# Patient Record
Sex: MALE | Race: BLACK OR AFRICAN AMERICAN | NOT HISPANIC OR LATINO | Employment: FULL TIME | URBAN - METROPOLITAN AREA
[De-identification: names, ages, dates, MRNs, and addresses within clinical notes are randomized per-mention and may not be internally consistent; named-entity substitution may affect disease eponyms.]

---

## 2017-03-24 ENCOUNTER — OFFICE VISIT (OUTPATIENT)
Dept: FAMILY MEDICINE | Facility: CLINIC | Age: 57
End: 2017-03-24
Payer: COMMERCIAL

## 2017-03-24 ENCOUNTER — LAB VISIT (OUTPATIENT)
Dept: LAB | Facility: HOSPITAL | Age: 57
End: 2017-03-24
Attending: INTERNAL MEDICINE
Payer: COMMERCIAL

## 2017-03-24 VITALS
HEART RATE: 74 BPM | HEIGHT: 67 IN | BODY MASS INDEX: 32.62 KG/M2 | DIASTOLIC BLOOD PRESSURE: 70 MMHG | WEIGHT: 207.81 LBS | TEMPERATURE: 99 F | SYSTOLIC BLOOD PRESSURE: 122 MMHG | OXYGEN SATURATION: 98 %

## 2017-03-24 DIAGNOSIS — I10 HYPERTENSION, WELL CONTROLLED: ICD-10-CM

## 2017-03-24 DIAGNOSIS — R73.03 PREDIABETES: ICD-10-CM

## 2017-03-24 DIAGNOSIS — Z00.00 ROUTINE MEDICAL EXAM: ICD-10-CM

## 2017-03-24 DIAGNOSIS — Z00.00 ROUTINE MEDICAL EXAM: Primary | ICD-10-CM

## 2017-03-24 DIAGNOSIS — E66.9 OBESITY, CLASS I, BMI 30-34.9: ICD-10-CM

## 2017-03-24 DIAGNOSIS — Z72.0 TOBACCO USE: ICD-10-CM

## 2017-03-24 LAB
ALBUMIN SERPL BCP-MCNC: 3.9 G/DL
ALP SERPL-CCNC: 45 U/L
ALT SERPL W/O P-5'-P-CCNC: 18 U/L
ANION GAP SERPL CALC-SCNC: 8 MMOL/L
AST SERPL-CCNC: 15 U/L
BASOPHILS # BLD AUTO: 0.01 K/UL
BASOPHILS NFR BLD: 0.2 %
BILIRUB SERPL-MCNC: 1.3 MG/DL
BUN SERPL-MCNC: 15 MG/DL
CALCIUM SERPL-MCNC: 9.6 MG/DL
CHLORIDE SERPL-SCNC: 104 MMOL/L
CHOLEST/HDLC SERPL: 3.7 {RATIO}
CO2 SERPL-SCNC: 28 MMOL/L
CREAT SERPL-MCNC: 1 MG/DL
DIFFERENTIAL METHOD: ABNORMAL
EOSINOPHIL # BLD AUTO: 0.2 K/UL
EOSINOPHIL NFR BLD: 3.9 %
ERYTHROCYTE [DISTWIDTH] IN BLOOD BY AUTOMATED COUNT: 14.5 %
EST. GFR  (AFRICAN AMERICAN): >60 ML/MIN/1.73 M^2
EST. GFR  (NON AFRICAN AMERICAN): >60 ML/MIN/1.73 M^2
GLUCOSE SERPL-MCNC: 106 MG/DL
HCT VFR BLD AUTO: 40.3 %
HDL/CHOLESTEROL RATIO: 26.9 %
HDLC SERPL-MCNC: 156 MG/DL
HDLC SERPL-MCNC: 42 MG/DL
HGB BLD-MCNC: 13 G/DL
LDLC SERPL CALC-MCNC: 99.2 MG/DL
LYMPHOCYTES # BLD AUTO: 1.5 K/UL
LYMPHOCYTES NFR BLD: 33.6 %
MCH RBC QN AUTO: 28.7 PG
MCHC RBC AUTO-ENTMCNC: 32.3 %
MCV RBC AUTO: 89 FL
MONOCYTES # BLD AUTO: 0.5 K/UL
MONOCYTES NFR BLD: 10.8 %
NEUTROPHILS # BLD AUTO: 2.2 K/UL
NEUTROPHILS NFR BLD: 51.3 %
NONHDLC SERPL-MCNC: 114 MG/DL
PLATELET # BLD AUTO: 234 K/UL
PMV BLD AUTO: 11.7 FL
POTASSIUM SERPL-SCNC: 4.7 MMOL/L
PROT SERPL-MCNC: 7.3 G/DL
RBC # BLD AUTO: 4.53 M/UL
SODIUM SERPL-SCNC: 140 MMOL/L
TRIGL SERPL-MCNC: 74 MG/DL
WBC # BLD AUTO: 4.37 K/UL

## 2017-03-24 PROCEDURE — 80053 COMPREHEN METABOLIC PANEL: CPT

## 2017-03-24 PROCEDURE — 3078F DIAST BP <80 MM HG: CPT | Mod: S$GLB,,, | Performed by: INTERNAL MEDICINE

## 2017-03-24 PROCEDURE — 85025 COMPLETE CBC W/AUTO DIFF WBC: CPT

## 2017-03-24 PROCEDURE — 99396 PREV VISIT EST AGE 40-64: CPT | Mod: S$GLB,,, | Performed by: INTERNAL MEDICINE

## 2017-03-24 PROCEDURE — 36415 COLL VENOUS BLD VENIPUNCTURE: CPT | Mod: PO

## 2017-03-24 PROCEDURE — 99999 PR PBB SHADOW E&M-EST. PATIENT-LVL III: CPT | Mod: PBBFAC,,, | Performed by: INTERNAL MEDICINE

## 2017-03-24 PROCEDURE — 83036 HEMOGLOBIN GLYCOSYLATED A1C: CPT

## 2017-03-24 PROCEDURE — 3074F SYST BP LT 130 MM HG: CPT | Mod: S$GLB,,, | Performed by: INTERNAL MEDICINE

## 2017-03-24 PROCEDURE — 80061 LIPID PANEL: CPT

## 2017-03-24 RX ORDER — LISINOPRIL 20 MG/1
20 TABLET ORAL DAILY
Qty: 90 TABLET | Refills: 3 | Status: SHIPPED | OUTPATIENT
Start: 2017-03-24 | End: 2018-08-03 | Stop reason: SDUPTHER

## 2017-03-24 RX ORDER — METFORMIN HYDROCHLORIDE 500 MG/1
500 TABLET ORAL
Qty: 90 TABLET | Refills: 3 | Status: SHIPPED | OUTPATIENT
Start: 2017-03-24 | End: 2018-08-03 | Stop reason: SDUPTHER

## 2017-03-24 NOTE — MR AVS SNAPSHOT
[facility]  [address]  [place] [address]  Phone: [phone]  Fax: [phone]                  Juvencio Viera   3/24/2017 7:00 AM   Office Visit    Description:  Male : 1960   Provider:  Brice Manuel MD   Department:  [facility] Medicine           Reason for Visit     Annual Exam           Diagnoses this Visit        Comments    Routine medical exam    -  Primary     Hypertension, well controlled         Prediabetes         Obesity, Class I, BMI 30-34.9         Tobacco use                To Do List           Goals (5 Years of Data)     None      Follow-Up and Disposition     Return in about 1 year (around 3/24/2018) for Routine Physical.       These Medications        Disp Refills Start End    metformin (GLUCOPHAGE) 500 MG tablet 90 tablet 3 3/24/2017     Take 1 tablet (500 mg total) by mouth daily with breakfast. - Oral    Pharmacy: [facility]/pharmacy #[id number] - [place] - [address] Ph #: [phone]       lisinopril (PRINIVIL,ZESTRIL) 20 MG tablet 90 tablet 3 3/24/2017     Take 1 tablet (20 mg total) by mouth once daily. - Oral    Pharmacy: [facility]/pharmacy #[id number] - [place], [place] - [address] Ph #: [phone]         [facility] On Call     [facility] On Call Nurse Care Line -  Assistance  Registered nurses in the [facility] provide clinical advisement, health education, appointment booking, and other advisory services.  Call for this free service at [phone].             Medications           Message regarding Medications     Verify the changes and/or additions to your medication regime listed below are the same as discussed with your clinician today.  If any of these changes or additions are incorrect, please notify your healthcare provider.        CHANGE how you are taking these medications     Start Taking Instead of    metformin (GLUCOPHAGE) 500 MG tablet metformin (GLUCOPHAGE) 500 MG tablet    Dosage:  Take 1 tablet (500  "mg total) by mouth daily with breakfast. Dosage:  TAKE 1 TABLET (500 MG TOTAL) BY MOUTH 2 (TWO) TIMES DAILY WITH MEALS.    Reason for Change:  Reorder            Verify that the below list of medications is an accurate representation of the medications you are currently taking.  If none reported, the list may be blank. If incorrect, please contact your healthcare provider. Carry this list with you in case of emergency.           Current Medications     blood sugar diagnostic Strp 1 strip by Misc.(Non-Drug; Combo Route) route 2 (two) times daily.    blood-glucose meter Misc 1 each by Misc.(Non-Drug; Combo Route) route 2 (two) times daily.    lancets 33 gauge Misc 1 lancet by Misc.(Non-Drug; Combo Route) route 2 (two) times daily.    lisinopril (PRINIVIL,ZESTRIL) 20 MG tablet Take 1 tablet (20 mg total) by mouth once daily.    metformin (GLUCOPHAGE) 500 MG tablet Take 1 tablet (500 mg total) by mouth daily with breakfast.           Clinical Reference Information           Your Vitals Were     BP Pulse Temp Height Weight SpO2    122/70 (BP Location: Left arm, Patient Position: Sitting, BP Method: Manual) 74 98.6 °F (37 °C) (Oral) 5' 7" (1.702 m) 94.3 kg (207 lb 12.5 oz) 98%    BMI                32.54 kg/m2          Blood Pressure          Most Recent Value    BP  122/70      Allergies as of 3/24/2017     No Known Allergies      Immunizations Administered on Date of Encounter - 3/24/2017     None      Orders Placed During Today's Visit     Future Labs/Procedures Expected by Expires    CBC auto differential  3/24/2017 3/24/2018    Comprehensive metabolic panel  3/24/2017 3/24/2018    Hemoglobin A1c  3/24/2017 3/24/2018    Lipid panel  3/24/2017 3/24/2018      [facility] Sign-Up     Activating your [facility] account is as easy as 1-2-3!     1) Visit [email or web address], select Sign Up Now, enter this activation code and your date of birth, then select Next.  [email or web address]  Expires: 5/8/2017  7:20 AM      2) Create a " username and password to use when you visit [facility] in the future and select a security question in case you lose your password and select Next.    3) Enter your e-mail address and click Sign Up!    Additional Information  If you have questions, please e-mail [email or web address] or call [phone] to talk to our [facility] [employer] staff. Remember, [facility] is NOT to be used for urgent needs. For medical emergencies, dial 911.         Instructions    Think about coming off of the cigars.  This is a big cancer risk       Smoking Cessation     If you would like to quit smoking:   You may be eligible for free services if you are a [place] resident and started smoking cigarettes before September 1, 1988.  Call the Smoking Cessation Trust ([facility]) toll free at (053) [phone] or ([phone].   Call 5-174-QUIT-NOW if you do not meet the above criteria.            Language Assistance Services     ATTENTION: Language assistance services are available, free of charge. Please call [phone].      ATENCIÓN: Si habla español, tiene a burger disposición servicios gratuitos de asistencia lingüística. Llame al [phone].     KHANH Ý: N?u b?n nói Ti?ng Vi?t, có các d?ch v? h? tr? ngôn ng? mi?n phí dành cho b?n. G?i s? [phone].         [facility] complies with applicable Federal civil rights laws and does not discriminate on the basis of race, color, national origin, age, disability, or sex.

## 2017-03-24 NOTE — ASSESSMENT & PLAN NOTE
The patient is asked to make an attempt to improve diet and exercise patterns to aid in medical management of this problem.

## 2017-03-24 NOTE — ASSESSMENT & PLAN NOTE
Smoking 1 cigar daily.  I counseled the patient on smoking cessation, risks associated with smoking, having a quit date, nicotine replacement, medications that can aid in cessation, and having a plan for future cravings.  The patient stated that he is still not interested in stopping

## 2017-03-24 NOTE — PROGRESS NOTES
Chief Complaint  Chief Complaint   Patient presents with    Annual Exam       HPI  Juvencio Viera is a 56 y.o. male with multiple medical diagnoses as listed in the medical history and problem list that presents for routine physical.  Pt is known to me with his last appointment 03/2016.  No acute complaints      PAST MEDICAL HISTORY:  Past Medical History:   Diagnosis Date    Hypertension        PAST SURGICAL HISTORY:  Past Surgical History:   Procedure Laterality Date    NO PAST SURGERIES         SOCIAL HISTORY:  Social History     Social History    Marital status: Single     Spouse name: N/A    Number of children: N/A    Years of education: N/A     Occupational History    Not on file.     Social History Main Topics    Smoking status: Current Every Day Smoker     Packs/day: 1.00     Types: Cigars    Smokeless tobacco: Not on file    Alcohol use Yes    Drug use: Not on file    Sexual activity: Not on file     Other Topics Concern    Not on file     Social History Narrative    No narrative on file       FAMILY HISTORY:  Family History   Problem Relation Age of Onset    Hypertension Mother     Diabetes Mother        ALLERGIES AND MEDICATIONS: updated and reviewed.  Review of patient's allergies indicates:  No Known Allergies  Current Outpatient Prescriptions   Medication Sig Dispense Refill    blood sugar diagnostic Strp 1 strip by Misc.(Non-Drug; Combo Route) route 2 (two) times daily. 100 each 11    blood-glucose meter Misc 1 each by Misc.(Non-Drug; Combo Route) route 2 (two) times daily. 1 each 0    lancets 33 gauge Misc 1 lancet by Misc.(Non-Drug; Combo Route) route 2 (two) times daily. 100 each 11    lisinopril (PRINIVIL,ZESTRIL) 20 MG tablet Take 1 tablet (20 mg total) by mouth once daily. 90 tablet 3    metformin (GLUCOPHAGE) 500 MG tablet Take 1 tablet (500 mg total) by mouth daily with breakfast. 90 tablet 3     No current facility-administered medications for this visit.   "        ROS  Review of Systems   Constitutional: Negative for chills, fever and unexpected weight change.   HENT: Negative for congestion, dental problem, ear pain, hearing loss, rhinorrhea, sore throat and trouble swallowing.    Eyes: Negative for discharge, redness and visual disturbance.   Respiratory: Negative for cough, chest tightness, shortness of breath and wheezing.    Cardiovascular: Negative for chest pain, palpitations and leg swelling.   Gastrointestinal: Negative for abdominal pain, constipation, diarrhea, nausea and vomiting.   Endocrine: Negative for polydipsia, polyphagia and polyuria.   Genitourinary: Negative for decreased urine volume, dysuria and hematuria.   Musculoskeletal: Negative for arthralgias and myalgias.   Skin: Negative for color change and rash.   Neurological: Negative for dizziness, weakness, light-headedness and headaches.   Psychiatric/Behavioral: Negative for decreased concentration, dysphoric mood, sleep disturbance and suicidal ideas.           Physical Exam  Vitals:    03/24/17 0704   BP: 122/70   BP Location: Left arm   Patient Position: Sitting   BP Method: Manual   Pulse: 74   Temp: 98.6 °F (37 °C)   TempSrc: Oral   SpO2: 98%   Weight: 94.3 kg (207 lb 12.5 oz)   Height: 5' 7" (1.702 m)    Body mass index is 32.54 kg/(m^2).  Weight: 94.3 kg (207 lb 12.5 oz)   Height: 5' 7" (170.2 cm)   General appearance: alert, appears stated age, cooperative and no distress  Head: Normocephalic, without obvious abnormality, atraumatic  Eyes: PERRL EOMI conjunctiva clear  Ears: normal TM's and external ear canals both ears  Nose: Nares normal. Septum midline. Mucosa normal. No drainage or sinus tenderness.  Throat: lips, mucosa, and tongue normal; teeth and gums normal  Neck: no adenopathy, no carotid bruit, no JVD, supple, symmetrical, trachea midline and thyroid not enlarged, symmetric, no tenderness/mass/nodules  Back: symmetric, no curvature. ROM normal. No CVA tenderness.  Lungs: " clear to auscultation bilaterally  Heart: regular rate and rhythm, S1, S2 normal, no murmur, click, rub or gallop  Abdomen: soft, non-tender; bowel sounds normal; no masses,  no organomegaly  Extremities: extremities normal, atraumatic, no cyanosis or edema  Pulses: 2+ and symmetric  Neurologic: Mental status: Alert, oriented, thought content appropriate  Sensory: normal  Motor:grossly normal  Coordination: normal  Gait: Normal   Symmetric facial movements palate elevated symmetrically tongue midline         Health Maintenance       Date Due Completion Date    TETANUS VACCINE 4/14/1976 ---    Influenza Vaccine 8/1/2016 3/11/2016 (Declined)    Override on 3/11/2016: Declined    Lipid Panel 3/11/2021 3/11/2016    Colonoscopy 5/4/2025 5/4/2015            Assessment & Plan  Problem List Items Addressed This Visit        Cardiac    Hypertension, well controlled (Chronic)    Current Assessment & Plan     The current medical regimen is effective;  continue present plan and medications.          Relevant Medications    lisinopril (PRINIVIL,ZESTRIL) 20 MG tablet       Fluids/Electrolytes/Nutrition/GI    Obesity, Class I, BMI 30-34.9 (Chronic)    Current Assessment & Plan     The patient is asked to make an attempt to improve diet and exercise patterns to aid in medical management of this problem.            Other    Prediabetes (Chronic)    Current Assessment & Plan     On metformin once daily.  Recheck labs         Relevant Medications    metformin (GLUCOPHAGE) 500 MG tablet    Tobacco use    Current Assessment & Plan     Smoking 1 cigar daily.  I counseled the patient on smoking cessation, risks associated with smoking, having a quit date, nicotine replacement, medications that can aid in cessation, and having a plan for future cravings.  The patient stated that he is still not interested in stopping.            Other Visit Diagnoses     Routine medical exam    -  Primary  -   Discussed healthy diet, regular exercise,  necessary labs, age appropriate cancer screening, and routine vaccinations.   The natural history of prostate cancer and ongoing controversy regarding screening and potential treatment outcomes of prostate cancer has been discussed with the patient. The meaning of a false positive PSA and a false negative PSA has been discussed. He indicates understanding of the limitations of this screening test and wishes not to proceed with screening PSA testing.     Relevant Orders    CBC auto differential    Comprehensive metabolic panel    Lipid panel    Hemoglobin A1c            Health Maintenance reviewed, as above.  Continues to decline vaccines.    Follow-up: Return in about 1 year (around 3/24/2018) for Routine Physical.

## 2017-03-25 LAB
ESTIMATED AVG GLUCOSE: 131 MG/DL
HBA1C MFR BLD HPLC: 6.2 %

## 2017-04-10 DIAGNOSIS — I10 HYPERTENSION, WELL CONTROLLED: ICD-10-CM

## 2017-04-10 RX ORDER — LISINOPRIL 20 MG/1
TABLET ORAL
Qty: 90 TABLET | Refills: 3 | Status: SHIPPED | OUTPATIENT
Start: 2017-04-10 | End: 2018-08-03 | Stop reason: SDUPTHER

## 2017-05-16 DIAGNOSIS — I10 HYPERTENSION, WELL CONTROLLED: ICD-10-CM

## 2017-05-16 RX ORDER — LISINOPRIL 20 MG/1
TABLET ORAL
Qty: 90 TABLET | Refills: 3 | Status: SHIPPED | OUTPATIENT
Start: 2017-05-16 | End: 2018-08-03 | Stop reason: SDUPTHER

## 2017-05-28 DIAGNOSIS — R73.03 PREDIABETES: ICD-10-CM

## 2017-05-29 RX ORDER — METFORMIN HYDROCHLORIDE 500 MG/1
TABLET ORAL
Qty: 180 TABLET | Refills: 3 | Status: SHIPPED | OUTPATIENT
Start: 2017-05-29 | End: 2018-08-03 | Stop reason: SDUPTHER

## 2017-12-05 ENCOUNTER — TELEPHONE (OUTPATIENT)
Dept: FAMILY MEDICINE | Facility: CLINIC | Age: 57
End: 2017-12-05

## 2017-12-05 NOTE — TELEPHONE ENCOUNTER
----- Message from Parvin Ibarra sent at 12/5/2017  3:01 PM CST -----  Contact: [facility] [phone]  [facility] is following up on a request for diabetic supplies for the pt Please call  at your earliest convenience.  Thanks !

## 2017-12-05 NOTE — TELEPHONE ENCOUNTER
Supply request form was being faxed to wrong number, correct number was given and form will be faxed over.

## 2018-07-28 DIAGNOSIS — Z00.00 ROUTINE MEDICAL EXAM: Primary | ICD-10-CM

## 2018-07-29 RX ORDER — LISINOPRIL 20 MG/1
TABLET ORAL
Qty: 90 TABLET | Refills: 1 | Status: SHIPPED | OUTPATIENT
Start: 2018-07-29 | End: 2018-08-03 | Stop reason: SDUPTHER

## 2018-07-29 RX ORDER — METFORMIN HYDROCHLORIDE 500 MG/1
TABLET ORAL
Qty: 90 TABLET | Refills: 0 | OUTPATIENT
Start: 2018-07-29

## 2018-07-29 NOTE — TELEPHONE ENCOUNTER
Refill refused.  Pt has not been seen in over a year.  Due for routine physical with labs prior.  Please sched.    Will refill once Ov made.      Thank you,  Timmy

## 2018-07-30 NOTE — TELEPHONE ENCOUNTER
Spoken to patient's wife. Will call back to scheduled appt(s). Patient is currently at work right now,

## 2018-08-03 ENCOUNTER — OFFICE VISIT (OUTPATIENT)
Dept: FAMILY MEDICINE | Facility: CLINIC | Age: 58
End: 2018-08-03
Payer: COMMERCIAL

## 2018-08-03 ENCOUNTER — LAB VISIT (OUTPATIENT)
Dept: LAB | Facility: HOSPITAL | Age: 58
End: 2018-08-03
Attending: FAMILY MEDICINE
Payer: COMMERCIAL

## 2018-08-03 VITALS
DIASTOLIC BLOOD PRESSURE: 80 MMHG | HEIGHT: 67 IN | SYSTOLIC BLOOD PRESSURE: 130 MMHG | TEMPERATURE: 98 F | OXYGEN SATURATION: 98 % | WEIGHT: 216.69 LBS | BODY MASS INDEX: 34.01 KG/M2 | HEART RATE: 62 BPM

## 2018-08-03 DIAGNOSIS — Z00.00 ROUTINE PHYSICAL EXAMINATION: Primary | ICD-10-CM

## 2018-08-03 DIAGNOSIS — B35.3 TINEA PEDIS OF RIGHT FOOT: ICD-10-CM

## 2018-08-03 DIAGNOSIS — Z00.00 ROUTINE PHYSICAL EXAMINATION: ICD-10-CM

## 2018-08-03 DIAGNOSIS — E66.9 OBESITY, CLASS I, BMI 30-34.9: Chronic | ICD-10-CM

## 2018-08-03 DIAGNOSIS — I10 HYPERTENSION, WELL CONTROLLED: Chronic | ICD-10-CM

## 2018-08-03 DIAGNOSIS — Z72.0 TOBACCO USE: ICD-10-CM

## 2018-08-03 DIAGNOSIS — R73.03 PREDIABETES: ICD-10-CM

## 2018-08-03 LAB
ALBUMIN SERPL BCP-MCNC: 3.9 G/DL
ALP SERPL-CCNC: 52 U/L
ALT SERPL W/O P-5'-P-CCNC: 20 U/L
ANION GAP SERPL CALC-SCNC: 8 MMOL/L
AST SERPL-CCNC: 15 U/L
BASOPHILS # BLD AUTO: 0.02 K/UL
BASOPHILS NFR BLD: 0.4 %
BILIRUB SERPL-MCNC: 0.6 MG/DL
BUN SERPL-MCNC: 12 MG/DL
CALCIUM SERPL-MCNC: 9.5 MG/DL
CHLORIDE SERPL-SCNC: 101 MMOL/L
CHOLEST SERPL-MCNC: 153 MG/DL
CHOLEST/HDLC SERPL: 4.1 {RATIO}
CO2 SERPL-SCNC: 27 MMOL/L
COMPLEXED PSA SERPL-MCNC: 0.61 NG/ML
CREAT SERPL-MCNC: 1 MG/DL
DIFFERENTIAL METHOD: ABNORMAL
EOSINOPHIL # BLD AUTO: 0.1 K/UL
EOSINOPHIL NFR BLD: 2.1 %
ERYTHROCYTE [DISTWIDTH] IN BLOOD BY AUTOMATED COUNT: 13.5 %
EST. GFR  (AFRICAN AMERICAN): >60 ML/MIN/1.73 M^2
EST. GFR  (NON AFRICAN AMERICAN): >60 ML/MIN/1.73 M^2
ESTIMATED AVG GLUCOSE: 166 MG/DL
GLUCOSE SERPL-MCNC: 118 MG/DL
HBA1C MFR BLD HPLC: 7.4 %
HCT VFR BLD AUTO: 37.1 %
HDLC SERPL-MCNC: 37 MG/DL
HDLC SERPL: 24.2 %
HGB BLD-MCNC: 11.8 G/DL
IMM GRANULOCYTES # BLD AUTO: 0.02 K/UL
IMM GRANULOCYTES NFR BLD AUTO: 0.4 %
LDLC SERPL CALC-MCNC: 101.4 MG/DL
LYMPHOCYTES # BLD AUTO: 1.5 K/UL
LYMPHOCYTES NFR BLD: 28.1 %
MCH RBC QN AUTO: 28.3 PG
MCHC RBC AUTO-ENTMCNC: 31.8 G/DL
MCV RBC AUTO: 89 FL
MONOCYTES # BLD AUTO: 0.6 K/UL
MONOCYTES NFR BLD: 10.4 %
NEUTROPHILS # BLD AUTO: 3.1 K/UL
NEUTROPHILS NFR BLD: 58.6 %
NONHDLC SERPL-MCNC: 116 MG/DL
NRBC BLD-RTO: 0 /100 WBC
PLATELET # BLD AUTO: 260 K/UL
PMV BLD AUTO: 11.4 FL
POTASSIUM SERPL-SCNC: 4.4 MMOL/L
PROT SERPL-MCNC: 7.1 G/DL
RBC # BLD AUTO: 4.17 M/UL
SODIUM SERPL-SCNC: 136 MMOL/L
T4 FREE SERPL-MCNC: 0.92 NG/DL
TRIGL SERPL-MCNC: 73 MG/DL
TSH SERPL DL<=0.005 MIU/L-ACNC: 1.03 UIU/ML
WBC # BLD AUTO: 5.3 K/UL

## 2018-08-03 PROCEDURE — 84439 ASSAY OF FREE THYROXINE: CPT

## 2018-08-03 PROCEDURE — 85025 COMPLETE CBC W/AUTO DIFF WBC: CPT

## 2018-08-03 PROCEDURE — 36415 COLL VENOUS BLD VENIPUNCTURE: CPT | Mod: PO

## 2018-08-03 PROCEDURE — 99396 PREV VISIT EST AGE 40-64: CPT | Mod: S$GLB,,, | Performed by: FAMILY MEDICINE

## 2018-08-03 PROCEDURE — 3075F SYST BP GE 130 - 139MM HG: CPT | Mod: CPTII,S$GLB,, | Performed by: FAMILY MEDICINE

## 2018-08-03 PROCEDURE — 83036 HEMOGLOBIN GLYCOSYLATED A1C: CPT

## 2018-08-03 PROCEDURE — 84153 ASSAY OF PSA TOTAL: CPT

## 2018-08-03 PROCEDURE — 84443 ASSAY THYROID STIM HORMONE: CPT

## 2018-08-03 PROCEDURE — 80053 COMPREHEN METABOLIC PANEL: CPT

## 2018-08-03 PROCEDURE — 99999 PR PBB SHADOW E&M-EST. PATIENT-LVL III: CPT | Mod: PBBFAC,,, | Performed by: FAMILY MEDICINE

## 2018-08-03 PROCEDURE — 3079F DIAST BP 80-89 MM HG: CPT | Mod: CPTII,S$GLB,, | Performed by: FAMILY MEDICINE

## 2018-08-03 PROCEDURE — 80061 LIPID PANEL: CPT

## 2018-08-03 RX ORDER — KETOCONAZOLE 20 MG/G
CREAM TOPICAL DAILY
Qty: 30 G | Refills: 0 | Status: SHIPPED | OUTPATIENT
Start: 2018-08-03

## 2018-08-03 RX ORDER — LISINOPRIL 20 MG/1
20 TABLET ORAL DAILY
Qty: 90 TABLET | Refills: 3 | Status: SHIPPED | OUTPATIENT
Start: 2018-08-03 | End: 2019-12-20 | Stop reason: SDUPTHER

## 2018-08-03 RX ORDER — LANCETS 33 GAUGE
1 EACH MISCELLANEOUS 2 TIMES DAILY
Qty: 100 EACH | Refills: 11 | Status: SHIPPED | OUTPATIENT
Start: 2018-08-03 | End: 2018-08-20 | Stop reason: SDUPTHER

## 2018-08-03 RX ORDER — METFORMIN HYDROCHLORIDE 500 MG/1
TABLET ORAL
Qty: 180 TABLET | Refills: 3 | Status: SHIPPED | OUTPATIENT
Start: 2018-08-03 | End: 2018-09-05

## 2018-08-03 NOTE — PROGRESS NOTES
[facility]  Progress Note    SUBJECTIVE:     Chief Complaint   Patient presents with    Annual Exam       HPI   Juvencio Viera  is a 58 y.o. male here for routine physical exam. Patient has no other new complaints/problems at this time.      Review of patient's allergies indicates:  No Known Allergies    Past Medical History:   Diagnosis Date    Hypertension      Past Surgical History:   Procedure Laterality Date    NO PAST SURGERIES       Family History   Problem Relation Age of Onset    Hypertension Mother     Diabetes Mother      Social History   Substance Use Topics    Smoking status: Current Every Day Smoker     Packs/day: 1.00     Types: Cigars    Smokeless tobacco: Current User     Types: Snuff    Alcohol use Yes        Review of Systems   Constitutional: Negative for chills, diaphoresis and fever.   HENT: Negative for congestion, ear pain and sore throat.    Eyes: Negative for photophobia and discharge.   Respiratory: Negative for cough, shortness of breath and wheezing.    Cardiovascular: Negative for chest pain and palpitations.   Gastrointestinal: Negative for abdominal pain, constipation, diarrhea, nausea and vomiting.   Genitourinary: Negative for dysuria and hematuria.   Musculoskeletal: Negative for back pain and myalgias.   Skin: Negative for itching and rash.   Neurological: Negative for dizziness, sensory change, focal weakness, weakness and headaches.   All other systems reviewed and are negative.    OBJECTIVE:     Vitals:    08/03/18 0940   BP: 130/80   Pulse: 62   Temp: 98.3 °F (36.8 °C)     Body mass index is 33.94 kg/m².    Physical Exam   Constitutional: He is oriented to person, place, and time and well-developed, well-nourished, and in no distress. No distress.   HENT:   Head: Normocephalic and atraumatic.   Right Ear: Tympanic membrane is not perforated, not erythematous and not bulging. No hemotympanum.   Left Ear: Tympanic membrane is not perforated, not erythematous  and not bulging. No hemotympanum.   Mouth/Throat: Oropharynx is clear and moist. No oropharyngeal exudate.   Eyes: Conjunctivae and EOM are normal. Pupils are equal, round, and reactive to light.   Neck: No thyromegaly present.   Cardiovascular: Normal rate, regular rhythm and normal heart sounds.  Exam reveals no gallop and no friction rub.    No murmur heard.  Pulmonary/Chest: Effort normal and breath sounds normal. No respiratory distress. He has no wheezes. He has no rales.   Abdominal: Soft. Bowel sounds are normal. He exhibits no distension. There is no tenderness. There is no rebound and no guarding.   Musculoskeletal: Normal range of motion. He exhibits no edema or tenderness.   Lymphadenopathy:     He has no cervical adenopathy.   Neurological: He is alert and oriented to person, place, and time.   Skin: Skin is warm. Rash (seen on anterior surface of right ankle. velvety, slightly scaly. no discharge, or skin breaks) noted. He is not diaphoretic. No erythema.       Old records were reviewed. Labs and/or images were independently reviewed.    ASSESSMENT     1. Routine physical examination    2. Prediabetes    3. Hypertension, well controlled    4. Obesity, Class I, BMI 30-34.9    5. Tobacco use    6. Tinea pedis of right foot        PLAN:     Routine physical examination  -     CBC auto differential; Future  -     Comprehensive metabolic panel; Future  -     Hemoglobin A1c; Future  -     Lipid panel; Future  -     TSH; Future  -     T4, free; Future  -     PSA, Screening; Future; Expected date: 08/03/2018  -     We briefly discussed diet, exercise, and routine preventive exams. All questions and comments addressed.    Prediabetes  -     blood sugar diagnostic Strp; 1 strip by Misc.(Non-Drug; Combo Route) route 2 (two) times daily.  Dispense: 100 each; Refill: 11  -     lancets 33 gauge Misc; 1 lancet by Misc.(Non-Drug; Combo Route) route 2 (two) times daily.  Dispense: 100 each; Refill: 11  -     metFORMIN  (GLUCOPHAGE) 500 MG tablet; TAKE 1 TABLET (500 MG TOTAL) BY MOUTH 2 (TWO) TIMES DAILY WITH MEALS.  Dispense: 180 tablet; Refill: 3  -     Stable. Continue current regimen.    Hypertension, well controlled   -     Stable. Continue current regimen.              -     lisinopril (PRINIVIL,ZESTRIL) 20 MG tablet; Take 1 tablet (20 mg total) by mouth once daily.  Dispense: 90 tablet; Refill: 3    Obesity, Class I, BMI 30-34.9   -     Counseled patient about healthy diet, exercise habits, and to increase physical activity.    Tobacco use   -     Patient not ready at this time.    Tinea    -      Sent anti fungal cream to pharmacy. Advised to bring a change of socks to work and to keep area clean and dry.     RTC KAYLEE Jimenez MD  08/03/2018 10:01 AM

## 2018-08-13 ENCOUNTER — TELEPHONE (OUTPATIENT)
Dept: FAMILY MEDICINE | Facility: CLINIC | Age: 58
End: 2018-08-13

## 2018-08-13 NOTE — TELEPHONE ENCOUNTER
----- Message from Rebecca Almodovar sent at 8/13/2018  2:35 PM CDT -----  Contact: Krishna Lopez   [phone]  Rep is requesting prescription for ---blood-glucose meter----       [facility] - [place], [place] [address]  [address] [address]  Phone: [phone] Fax: [phone]

## 2018-08-20 DIAGNOSIS — R73.03 PREDIABETES: ICD-10-CM

## 2018-08-21 RX ORDER — LANCETS 33 GAUGE
1 EACH MISCELLANEOUS 2 TIMES DAILY
Qty: 100 EACH | Refills: 11 | Status: SHIPPED | OUTPATIENT
Start: 2018-08-21 | End: 2018-08-28 | Stop reason: SDUPTHER

## 2018-08-21 RX ORDER — DEXTROSE 4 G
TABLET,CHEWABLE ORAL
Qty: 1 EACH | Refills: 0 | Status: SHIPPED | OUTPATIENT
Start: 2018-08-21 | End: 2018-08-28

## 2018-08-23 ENCOUNTER — TELEPHONE (OUTPATIENT)
Dept: FAMILY MEDICINE | Facility: CLINIC | Age: 58
End: 2018-08-23

## 2018-08-23 NOTE — TELEPHONE ENCOUNTER
----- Message from Lsia Saini sent at 8/23/2018 11:29 AM CDT -----  Contact: Keegan with [facility] [phone]  Keegan calling in regards to pt's order for contour next meter and strips. Please call to advise. Thank you.

## 2018-08-23 NOTE — TELEPHONE ENCOUNTER
Called pt's pharmacy spoke with Keegan the pharmacist gave her verbal clarification of pt's test strips and meter.

## 2018-08-28 DIAGNOSIS — R73.03 PREDIABETES: ICD-10-CM

## 2018-08-28 RX ORDER — LANCETS 33 GAUGE
1 EACH MISCELLANEOUS 2 TIMES DAILY
Qty: 200 EACH | Refills: 11 | Status: SHIPPED | OUTPATIENT
Start: 2018-08-28 | End: 2020-11-13 | Stop reason: SDUPTHER

## 2018-08-28 RX ORDER — DEXTROSE 4 G
TABLET,CHEWABLE ORAL
Qty: 1 EACH | Refills: 0 | Status: SHIPPED | OUTPATIENT
Start: 2018-08-28

## 2018-09-05 ENCOUNTER — LAB VISIT (OUTPATIENT)
Dept: LAB | Facility: HOSPITAL | Age: 58
End: 2018-09-05
Attending: INTERNAL MEDICINE
Payer: COMMERCIAL

## 2018-09-05 ENCOUNTER — CLINICAL SUPPORT (OUTPATIENT)
Dept: OPHTHALMOLOGY | Facility: CLINIC | Age: 58
End: 2018-09-05
Attending: INTERNAL MEDICINE
Payer: COMMERCIAL

## 2018-09-05 ENCOUNTER — OFFICE VISIT (OUTPATIENT)
Dept: FAMILY MEDICINE | Facility: CLINIC | Age: 58
End: 2018-09-05
Payer: COMMERCIAL

## 2018-09-05 VITALS
HEIGHT: 67 IN | WEIGHT: 217 LBS | TEMPERATURE: 98 F | DIASTOLIC BLOOD PRESSURE: 66 MMHG | BODY MASS INDEX: 34.06 KG/M2 | SYSTOLIC BLOOD PRESSURE: 122 MMHG | HEART RATE: 68 BPM | OXYGEN SATURATION: 98 %

## 2018-09-05 DIAGNOSIS — Z72.0 TOBACCO USE: Chronic | ICD-10-CM

## 2018-09-05 DIAGNOSIS — E78.5 HYPERLIPIDEMIA DUE TO TYPE 2 DIABETES MELLITUS: ICD-10-CM

## 2018-09-05 DIAGNOSIS — I10 HYPERTENSION, WELL CONTROLLED: Chronic | ICD-10-CM

## 2018-09-05 DIAGNOSIS — E11.9 TYPE 2 DIABETES MELLITUS WITHOUT COMPLICATION, WITHOUT LONG-TERM CURRENT USE OF INSULIN: Primary | ICD-10-CM

## 2018-09-05 DIAGNOSIS — E11.9 TYPE 2 DIABETES MELLITUS WITHOUT COMPLICATION, WITHOUT LONG-TERM CURRENT USE OF INSULIN: ICD-10-CM

## 2018-09-05 DIAGNOSIS — E11.69 HYPERLIPIDEMIA DUE TO TYPE 2 DIABETES MELLITUS: ICD-10-CM

## 2018-09-05 LAB
ALBUMIN/CREAT UR: 3.9 UG/MG
CREAT UR-MCNC: 127 MG/DL
MICROALBUMIN UR DL<=1MG/L-MCNC: 5 UG/ML

## 2018-09-05 PROCEDURE — 82043 UR ALBUMIN QUANTITATIVE: CPT

## 2018-09-05 PROCEDURE — 99214 OFFICE O/P EST MOD 30 MIN: CPT | Mod: S$GLB,,, | Performed by: INTERNAL MEDICINE

## 2018-09-05 PROCEDURE — 99999 PR PBB SHADOW E&M-EST. PATIENT-LVL III: CPT | Mod: PBBFAC,,, | Performed by: INTERNAL MEDICINE

## 2018-09-05 PROCEDURE — 3008F BODY MASS INDEX DOCD: CPT | Mod: CPTII,S$GLB,, | Performed by: INTERNAL MEDICINE

## 2018-09-05 PROCEDURE — 3074F SYST BP LT 130 MM HG: CPT | Mod: CPTII,S$GLB,, | Performed by: INTERNAL MEDICINE

## 2018-09-05 PROCEDURE — 3045F PR MOST RECENT HEMOGLOBIN A1C LEVEL 7.0-9.0%: CPT | Mod: CPTII,S$GLB,, | Performed by: INTERNAL MEDICINE

## 2018-09-05 PROCEDURE — 3078F DIAST BP <80 MM HG: CPT | Mod: CPTII,S$GLB,, | Performed by: INTERNAL MEDICINE

## 2018-09-05 PROCEDURE — 92250 FUNDUS PHOTOGRAPHY W/I&R: CPT | Mod: S$GLB,,, | Performed by: OPHTHALMOLOGY

## 2018-09-05 RX ORDER — METFORMIN HYDROCHLORIDE 500 MG/1
500 TABLET, EXTENDED RELEASE ORAL
Qty: 90 TABLET | Refills: 0 | Status: SHIPPED | OUTPATIENT
Start: 2018-09-05 | End: 2018-12-08 | Stop reason: SDUPTHER

## 2018-09-05 RX ORDER — ATORVASTATIN CALCIUM 20 MG/1
20 TABLET, FILM COATED ORAL DAILY
Qty: 90 TABLET | Refills: 3 | Status: SHIPPED | OUTPATIENT
Start: 2018-09-05 | End: 2019-09-27 | Stop reason: SDUPTHER

## 2018-09-05 NOTE — PROGRESS NOTES
HPI     57 y/o here for screening for diabetic retinopathy with non-dilated   fundus photos per Dr. Manuel. Photos taken by Kiersten Martinez.     Last edited by Susan Castellanos on 9/5/2018  2:54 PM. (History)            Assessment /Plan     For exam results, see Encounter Report.    Type 2 diabetes mellitus without complication, without long-term current use of insulin  -     Diabetic Eye Screening Photo      Please see Dr. Longo progress note for interpretation

## 2018-09-05 NOTE — PROGRESS NOTES
Assessment & Plan  Problem List Items Addressed This Visit        Cardiac/Vascular    Hypertension, well controlled (Chronic)    Current Assessment & Plan     The current medical regimen is effective;  continue present plan and medications.          Hyperlipidemia due to type 2 diabetes mellitus (Chronic)    Overview     10 year CVD risk 23% 09/2018         Current Assessment & Plan     Start statin therapy.  Will plan to add ASA 81mg at follow up         Relevant Medications    atorvastatin (LIPITOR) 20 MG tablet    Other Relevant Orders    Lipid panel       Endocrine    Type 2 diabetes mellitus without complication, without long-term current use of insulin - Primary (Chronic)    Current Assessment & Plan     Change to meformin XR 500mg due to diarrhea with higher dosing on short acting.  Referred to DM education.  Urine and eye today.  Repeat labs in Nov.           Relevant Medications    metFORMIN (GLUCOPHAGE-XR) 500 MG 24 hr tablet    Other Relevant Orders    Diabetic Eye Screening Photo    Microalbumin/creatinine urine ratio    Hemoglobin A1c    Comprehensive metabolic panel       Other    Tobacco use (Chronic)    Current Assessment & Plan     Counseled multiple times.  Seems contemplative today.  Monitor                  Health Maintenance reviewed, PCV discussion again at follow up.    Follow-up: Follow-up in about 2 months (around 11/5/2018) for follow up for chronic conditions.    ______________________________________________________________________    Chief Complaint  Chief Complaint   Patient presents with    Diabetes    Hypertension    Follow-up       HPI  Juvencio Viera is a 58 y.o. male with multiple medical diagnoses as listed in the medical history and problem list that presents for f/u HTN, diabetes.  Pt is known to me with his last appointment 8/3/2018.  He had labs at that time that returned with an A1c of 7.4%.  He had previously been in a prediabetic range.     He reports that he had only  been taking metformin 500mg once daily due to diarrhea with BID dosing.  Also eating more sweets.  Diet is generally heavy on fried foods, sodium, red meats.  Denies CP, SOB, palpitations, polyuria/dipsia/phagia.      The 10-year ASCVD risk score (Devorah MAROCS Jr., et al., 2013) is: 23.1%    Values used to calculate the score:      Age: 58 years      Sex: Male      Is Non- : Yes      Diabetic: Yes      Tobacco smoker: Yes      Systolic Blood Pressure: 122 mmHg      Is BP treated: No      HDL Cholesterol: 37 mg/dL      Total Cholesterol: 153 mg/dL    PAST MEDICAL HISTORY:  Past Medical History:   Diagnosis Date    Hypertension     Hypertension, well controlled 2/27/2015    Type 2 diabetes mellitus without complication, without long-term current use of insulin 9/5/2018       PAST SURGICAL HISTORY:  Past Surgical History:   Procedure Laterality Date    NO PAST SURGERIES         SOCIAL HISTORY:  Social History     Socioeconomic History    Marital status: Single     Spouse name: Not on file    Number of children: Not on file    Years of education: Not on file    Highest education level: Not on file   Social Needs    Financial resource strain: Not on file    Food insecurity - worry: Not on file    Food insecurity - inability: Not on file    Transportation needs - medical: Not on file    Transportation needs - non-medical: Not on file   Occupational History    Not on file   Tobacco Use    Smoking status: Current Every Day Smoker     Packs/day: 1.00     Types: Cigars    Smokeless tobacco: Current User     Types: Snuff   Substance and Sexual Activity    Alcohol use: Yes    Drug use: No    Sexual activity: Yes     Partners: Female   Other Topics Concern    Not on file   Social History Narrative    Not on file       FAMILY HISTORY:  Family History   Problem Relation Age of Onset    Hypertension Mother     Diabetes Mother        ALLERGIES AND MEDICATIONS: updated and reviewed.  Review  of patient's allergies indicates:  No Known Allergies  Current Outpatient Medications   Medication Sig Dispense Refill    blood sugar diagnostic Strp Test twice a day 200 each 11    blood-glucose meter Misc Whatever meter insurance covers 1 each 0    ketoconazole (NIZORAL) 2 % cream Apply topically once daily. 30 g 0    lancets 33 gauge Misc 1 lancet by Misc.(Non-Drug; Combo Route) route 2 (two) times daily. Lancets for a contour meter. Test twice a day 200 each 11    lisinopril (PRINIVIL,ZESTRIL) 20 MG tablet Take 1 tablet (20 mg total) by mouth once daily. 90 tablet 3    atorvastatin (LIPITOR) 20 MG tablet Take 1 tablet (20 mg total) by mouth once daily. 90 tablet 3    metFORMIN (GLUCOPHAGE-XR) 500 MG 24 hr tablet Take 1 tablet (500 mg total) by mouth daily with breakfast. 90 tablet 0     No current facility-administered medications for this visit.          ROS  Review of Systems   Constitutional: Negative for chills, fever and unexpected weight change.   HENT: Negative for congestion, dental problem, ear pain, hearing loss, rhinorrhea, sore throat and trouble swallowing.    Eyes: Negative for discharge, redness and visual disturbance.   Respiratory: Negative for cough, chest tightness, shortness of breath and wheezing.    Cardiovascular: Negative for chest pain, palpitations and leg swelling.   Gastrointestinal: Negative for abdominal pain, constipation, diarrhea, nausea and vomiting.   Endocrine: Negative for polydipsia, polyphagia and polyuria.   Genitourinary: Negative for decreased urine volume, dysuria and hematuria.   Musculoskeletal: Negative for arthralgias and myalgias.   Skin: Negative for color change and rash.   Neurological: Negative for dizziness, weakness, light-headedness and headaches.   Psychiatric/Behavioral: Negative for decreased concentration, dysphoric mood, sleep disturbance and suicidal ideas.           Physical Exam  Vitals:    09/05/18 1147   BP: 122/66   BP Location: Right arm  "  Patient Position: Sitting   BP Method: Medium (Manual)   Pulse: 68   Temp: 98.3 °F (36.8 °C)   TempSrc: Oral   SpO2: 98%   Weight: 98.4 kg (217 lb)   Height: 5' 7" (1.702 m)    Body mass index is 33.99 kg/m².  Weight: 98.4 kg (217 lb)   Height: 5' 7" (170.2 cm)   Physical Exam   Constitutional: He is oriented to person, place, and time. He appears well-developed and well-nourished. No distress.   HENT:   Head: Normocephalic and atraumatic.   Eyes: Conjunctivae, EOM and lids are normal. Pupils are equal, round, and reactive to light. No scleral icterus.   Neck: Full passive range of motion without pain. Carotid bruit is not present.   Cardiovascular: Normal rate, regular rhythm, normal heart sounds and intact distal pulses. Exam reveals no S3, no S4 and no friction rub.   No murmur heard.  Pulmonary/Chest: Effort normal and breath sounds normal. He has no wheezes. He has no rhonchi. He has no rales.   Musculoskeletal: He exhibits no edema or tenderness.   Lymphadenopathy:        Head (right side): No submental and no submandibular adenopathy present.        Head (left side): No submental and no submandibular adenopathy present.        Right: No supraclavicular adenopathy present.        Left: No supraclavicular adenopathy present.   Neurological: He is alert and oriented to person, place, and time.   Motor grossly intact.  Sensory grossly intact.  Symmetric facial movements palate elevated symmetrically tongue midline   Skin: Skin is warm and dry. No rash noted.   Psychiatric: He has a normal mood and affect. His speech is normal and behavior is normal. Thought content normal.         Health Maintenance       Date Due Completion Date    Foot Exam 04/14/1968 ---    Eye Exam 04/14/1968 ---    TETANUS VACCINE 04/14/1976 ---    Pneumococcal PPSV23 (Medium Risk) (1) 04/14/1976 ---    Low Dose Statin 04/14/1979 ---    Zoster Vaccine 04/14/2018 ---    Influenza Vaccine 08/01/2018 3/24/2017 (Declined)    Override on " 3/24/2017: Declined    Override on 3/11/2016: Declined    Hemoglobin A1c 02/03/2019 8/3/2018    Lipid Panel 08/03/2019 8/3/2018    Colonoscopy 05/04/2025 5/4/2015

## 2018-09-05 NOTE — PATIENT INSTRUCTIONS
Below is a list of free Apps that you may find helpful (some of them may not apply to you since this is a general list of helpful Apps):    Android & Apple users:  EatFit - Created by [facility] nutritionists.  Tons of great recipes, links to >100 restaurants in town with healthy choices, and shopping guides.    Fooducate - Helps with healthy diet and weight loss  Shop Well - Scan barcodes to foods to see if it is healthy or not.  It will also suggest healthier alternatives  Lose It - Calorie tracking and goal setting for weight loss.  Peer support is also available  Calorie Counter and Diet Tracker by MyFitnessPal - Helps count calories for food intake and calories burned during exercise  PopsuPivot Active - has pictures and videos of preloaded workout routines  [facility] Diabetes Pal - log for home sugars, diet, weight, and blood pressure  Headspace - Guides your through meditation.  The first 10 programs are free.

## 2018-09-05 NOTE — ASSESSMENT & PLAN NOTE
Change to meformin XR 500mg due to diarrhea with higher dosing on short acting.  Referred to DM education.  Urine and eye today.  Repeat labs in Nov.

## 2018-09-07 LAB
LEFT EYE DM RETINOPATHY: NEGATIVE
RIGHT EYE DM RETINOPATHY: NEGATIVE

## 2018-09-10 ENCOUNTER — TELEPHONE (OUTPATIENT)
Dept: OPHTHALMOLOGY | Facility: CLINIC | Age: 58
End: 2018-09-10

## 2018-09-12 ENCOUNTER — TELEPHONE (OUTPATIENT)
Dept: OPHTHALMOLOGY | Facility: CLINIC | Age: 58
End: 2018-09-12

## 2018-09-12 NOTE — TELEPHONE ENCOUNTER
Patient called in for his results for diabetic eye exam ; informed patient that he will need to be see in opthalmology in a year and I will set a recall for him.

## 2018-11-20 ENCOUNTER — LAB VISIT (OUTPATIENT)
Dept: LAB | Facility: HOSPITAL | Age: 58
End: 2018-11-20
Attending: INTERNAL MEDICINE
Payer: COMMERCIAL

## 2018-11-20 DIAGNOSIS — E11.9 TYPE 2 DIABETES MELLITUS WITHOUT COMPLICATION, WITHOUT LONG-TERM CURRENT USE OF INSULIN: ICD-10-CM

## 2018-11-20 DIAGNOSIS — E78.5 HYPERLIPIDEMIA DUE TO TYPE 2 DIABETES MELLITUS: ICD-10-CM

## 2018-11-20 DIAGNOSIS — E11.69 HYPERLIPIDEMIA DUE TO TYPE 2 DIABETES MELLITUS: ICD-10-CM

## 2018-11-20 LAB
ALBUMIN SERPL BCP-MCNC: 3.8 G/DL
ALP SERPL-CCNC: 52 U/L
ALT SERPL W/O P-5'-P-CCNC: 19 U/L
ANION GAP SERPL CALC-SCNC: 7 MMOL/L
AST SERPL-CCNC: 16 U/L
BILIRUB SERPL-MCNC: 1.1 MG/DL
BUN SERPL-MCNC: 12 MG/DL
CALCIUM SERPL-MCNC: 9.4 MG/DL
CHLORIDE SERPL-SCNC: 103 MMOL/L
CHOLEST SERPL-MCNC: 98 MG/DL
CHOLEST/HDLC SERPL: 2.6 {RATIO}
CO2 SERPL-SCNC: 29 MMOL/L
CREAT SERPL-MCNC: 0.9 MG/DL
EST. GFR  (AFRICAN AMERICAN): >60 ML/MIN/1.73 M^2
EST. GFR  (NON AFRICAN AMERICAN): >60 ML/MIN/1.73 M^2
ESTIMATED AVG GLUCOSE: 134 MG/DL
GLUCOSE SERPL-MCNC: 116 MG/DL
HBA1C MFR BLD HPLC: 6.3 %
HDLC SERPL-MCNC: 38 MG/DL
HDLC SERPL: 38.8 %
LDLC SERPL CALC-MCNC: 49.4 MG/DL
NONHDLC SERPL-MCNC: 60 MG/DL
POTASSIUM SERPL-SCNC: 4.5 MMOL/L
PROT SERPL-MCNC: 7 G/DL
SODIUM SERPL-SCNC: 139 MMOL/L
TRIGL SERPL-MCNC: 53 MG/DL

## 2018-11-20 PROCEDURE — 36415 COLL VENOUS BLD VENIPUNCTURE: CPT | Mod: PO

## 2018-11-20 PROCEDURE — 80061 LIPID PANEL: CPT

## 2018-11-20 PROCEDURE — 80053 COMPREHEN METABOLIC PANEL: CPT

## 2018-11-20 PROCEDURE — 83036 HEMOGLOBIN GLYCOSYLATED A1C: CPT

## 2018-11-21 ENCOUNTER — PATIENT OUTREACH (OUTPATIENT)
Dept: ADMINISTRATIVE | Facility: HOSPITAL | Age: 58
End: 2018-11-21

## 2018-11-27 ENCOUNTER — OFFICE VISIT (OUTPATIENT)
Dept: FAMILY MEDICINE | Facility: CLINIC | Age: 58
End: 2018-11-27
Payer: COMMERCIAL

## 2018-11-27 VITALS
DIASTOLIC BLOOD PRESSURE: 76 MMHG | SYSTOLIC BLOOD PRESSURE: 128 MMHG | TEMPERATURE: 98 F | WEIGHT: 207 LBS | HEART RATE: 75 BPM | HEIGHT: 67 IN | OXYGEN SATURATION: 98 % | BODY MASS INDEX: 32.49 KG/M2

## 2018-11-27 DIAGNOSIS — B35.3 TINEA PEDIS OF BOTH FEET: ICD-10-CM

## 2018-11-27 DIAGNOSIS — E11.9 TYPE 2 DIABETES MELLITUS WITHOUT COMPLICATION, WITHOUT LONG-TERM CURRENT USE OF INSULIN: Chronic | ICD-10-CM

## 2018-11-27 DIAGNOSIS — I10 HYPERTENSION, WELL CONTROLLED: Primary | Chronic | ICD-10-CM

## 2018-11-27 DIAGNOSIS — Z72.0 TOBACCO USE: Chronic | ICD-10-CM

## 2018-11-27 DIAGNOSIS — E78.5 HYPERLIPIDEMIA DUE TO TYPE 2 DIABETES MELLITUS: Chronic | ICD-10-CM

## 2018-11-27 DIAGNOSIS — E11.69 HYPERLIPIDEMIA DUE TO TYPE 2 DIABETES MELLITUS: Chronic | ICD-10-CM

## 2018-11-27 PROCEDURE — 99999 PR PBB SHADOW E&M-EST. PATIENT-LVL III: CPT | Mod: PBBFAC,,, | Performed by: INTERNAL MEDICINE

## 2018-11-27 PROCEDURE — 3078F DIAST BP <80 MM HG: CPT | Mod: CPTII,S$GLB,, | Performed by: INTERNAL MEDICINE

## 2018-11-27 PROCEDURE — 3008F BODY MASS INDEX DOCD: CPT | Mod: CPTII,S$GLB,, | Performed by: INTERNAL MEDICINE

## 2018-11-27 PROCEDURE — 3074F SYST BP LT 130 MM HG: CPT | Mod: CPTII,S$GLB,, | Performed by: INTERNAL MEDICINE

## 2018-11-27 PROCEDURE — 99214 OFFICE O/P EST MOD 30 MIN: CPT | Mod: S$GLB,,, | Performed by: INTERNAL MEDICINE

## 2018-11-27 PROCEDURE — 3044F HG A1C LEVEL LT 7.0%: CPT | Mod: CPTII,S$GLB,, | Performed by: INTERNAL MEDICINE

## 2018-11-27 RX ORDER — ASPIRIN 81 MG/1
81 TABLET ORAL DAILY
Refills: 0 | COMMUNITY
Start: 2018-11-27 | End: 2019-06-14

## 2018-11-27 NOTE — PROGRESS NOTES
Assessment & Plan  Problem List Items Addressed This Visit        Cardiac/Vascular    Hypertension, well controlled - Primary (Chronic)    Current Assessment & Plan     The current medical regimen is effective;  continue present plan and medications.          Relevant Medications    aspirin (ECOTRIN) 81 MG EC tablet    Other Relevant Orders    CBC auto differential    Comprehensive metabolic panel    Lipid panel    Hyperlipidemia due to type 2 diabetes mellitus (Chronic)    Overview     10 year CVD risk 23% 09/2018         Current Assessment & Plan     Will keep him on current dose due to high risk score. Add ASA            Endocrine    Type 2 diabetes mellitus without complication, without long-term current use of insulin (Chronic)    Current Assessment & Plan     The current medical regimen is effective;  continue present plan and medications.          Relevant Orders    Hemoglobin A1c    Microalbumin/creatinine urine ratio       Other    Tobacco use (Chronic)    Current Assessment & Plan     Counseled multiple times.  Seems contemplative today.  Monitor           Other Visit Diagnoses     Tinea pedis of both feet    -  Start Lamisil AT            [facility] Maintenance reviewed, declined vaccinations despite counseling.     Follow-up: Follow-up in about 6 months (around 5/27/2019) for Routine Physical.    ______________________________________________________________________    Chief Complaint  Chief Complaint   Patient presents with    Diabetes    Hypertension    Hyperlipidemia    Follow-up       HPI  Juvencio Viera is a 58 y.o. male with multiple medical diagnoses as listed in the medical history and problem list that presents for HTN DM and HLD follow up.  Pt is known to me with his last appointment 9/5/2018.  He had labs prior to OV today that showed reassuring CMP, LDL of 49, and A1c that is now 6.3%    We had changed him to metformin XR at the LOV and started atorvastatin.  He is taking and tolerating  his medications without perceived side effects.  Diarrhea resolved.  No CP, SOB, palpitations, hypoglycemic symptoms.  He is still smoking cigars.  Quite for 1 week but then returned.            PAST MEDICAL HISTORY:  Past Medical History:   Diagnosis Date    Hypertension     Hypertension, well controlled 2/27/2015    Type 2 diabetes mellitus without complication, without long-term current use of insulin 9/5/2018       PAST SURGICAL HISTORY:  Past Surgical History:   Procedure Laterality Date    COLONOSCOPY N/A 5/4/2015    [facility]    NO PAST SURGERIES         SOCIAL HISTORY:  Social History     Socioeconomic History    Marital status: Single     Spouse name: Not on file    Number of children: Not on file    Years of education: Not on file    Highest education level: Not on file   Social Needs    Financial resource strain: Not on file    Food insecurity - worry: Not on file    Food insecurity - inability: Not on file    Transportation needs - medical: Not on file    Transportation needs - non-medical: Not on file   Occupational History    Not on file   Tobacco Use    Smoking status: Current Every Day Smoker     Packs/day: 1.00     Types: Cigars    Smokeless tobacco: Current User     Types: Snuff   Substance and Sexual Activity    Alcohol use: Yes    Drug use: No    Sexual activity: Yes     Partners: Female   Other Topics Concern    Not on file   Social History Narrative    Not on file       FAMILY HISTORY:  Family History   Problem Relation Age of Onset    Hypertension Mother     Diabetes Mother        ALLERGIES AND MEDICATIONS: updated and reviewed.  Review of patient's allergies indicates:  No Known Allergies  Current Outpatient Medications   Medication Sig Dispense Refill    atorvastatin (LIPITOR) 20 MG tablet Take 1 tablet (20 mg total) by mouth once daily. 90 tablet 3    ketoconazole (NIZORAL) 2 % cream Apply topically once daily. 30 g 0     "lisinopril (PRINIVIL,ZESTRIL) 20 MG tablet Take 1 tablet (20 mg total) by mouth once daily. 90 tablet 3    metFORMIN (GLUCOPHAGE-XR) 500 MG 24 hr tablet Take 1 tablet (500 mg total) by mouth daily with breakfast. 90 tablet 0    aspirin (ECOTRIN) 81 MG EC tablet Take 1 tablet (81 mg total) by mouth once daily.  0    blood sugar diagnostic Strp Test twice a day 200 each 11    blood-glucose meter Misc Whatever meter insurance covers 1 each 0    lancets 33 gauge Misc 1 lancet by Misc.(Non-Drug; Combo Route) route 2 (two) times daily. Lancets for a contour meter. Test twice a day 200 each 11     No current facility-administered medications for this visit.          ROS  Review of Systems   Constitutional: Negative for chills, fever and unexpected weight change.   HENT: Negative for congestion, dental problem, ear pain, hearing loss, rhinorrhea, sore throat and trouble swallowing.    Eyes: Negative for discharge, redness and visual disturbance.   Respiratory: Negative for cough, chest tightness, shortness of breath and wheezing.    Cardiovascular: Negative for chest pain, palpitations and leg swelling.   Gastrointestinal: Negative for abdominal pain, constipation, diarrhea, nausea and vomiting.   Endocrine: Negative for polydipsia, polyphagia and polyuria.   Genitourinary: Negative for decreased urine volume, dysuria and hematuria.   Musculoskeletal: Negative for arthralgias and myalgias.   Skin: Negative for color change and rash.   Neurological: Negative for dizziness, weakness, light-headedness and headaches.   Psychiatric/Behavioral: Negative for decreased concentration, dysphoric mood, sleep disturbance and suicidal ideas.           Physical Exam  Vitals:    11/27/18 0939 11/27/18 1000   BP: (!) 140/78 128/76   Pulse: 75    Temp: 98.3 °F (36.8 °C)    SpO2: 98%    Weight: 93.9 kg (207 lb)    Height: 5' 7" (1.702 m)     Body mass index is 32.42 kg/m².  Weight: 93.9 kg (207 lb)   Height: 5' 7" (170.2 cm)   Physical " Exam   Constitutional: He is oriented to person, place, and time. He appears well-developed and well-nourished. No distress.   HENT:   Head: Normocephalic and atraumatic.   Eyes: Conjunctivae, EOM and lids are normal. Pupils are equal, round, and reactive to light. No scleral icterus.   Neck: Full passive range of motion without pain. Neck supple. No JVD present. Carotid bruit is not present. No thyromegaly present.   Cardiovascular: Normal rate, regular rhythm, normal heart sounds and intact distal pulses. Exam reveals no S3, no S4 and no friction rub.   No murmur heard.  Pulmonary/Chest: Effort normal and breath sounds normal. He has no wheezes. He has no rhonchi. He has no rales.   Abdominal: Soft. Bowel sounds are normal. There is no tenderness.   Musculoskeletal: He exhibits no edema or tenderness.   Lymphadenopathy:        Head (right side): No submental and no submandibular adenopathy present.        Head (left side): No submental and no submandibular adenopathy present.     He has no cervical adenopathy.        Right: No supraclavicular adenopathy present.        Left: No supraclavicular adenopathy present.   Neurological: He is alert and oriented to person, place, and time.   Motor grossly intact.  Sensory grossly intact.  Symmetric facial movements palate elevated symmetrically tongue midline   Skin: Skin is warm and dry. No rash noted.   Psychiatric: He has a normal mood and affect. His speech is normal and behavior is normal. Thought content normal.       Protective Sensation (w/ 10 gram monofilament):  Right: Intact  Left: Intact    Visual Inspection:  tinea pedis bilaterally    Pedal Pulses:   Right: Present  Left: Present    Posterior tibialis:   Right:Present  Left: Present     Health Maintenance       Date Due Completion Date    Foot Exam 04/14/1968 ---    TETANUS VACCINE 04/14/1976 ---    Pneumococcal PPSV23 (Medium Risk) (1) 04/14/1976 ---    Zoster Vaccine 04/14/2018 ---    Influenza Vaccine  08/01/2018 3/24/2017 (Declined)    Override on 3/24/2017: Declined    Override on 3/11/2016: Declined    Hemoglobin A1c 05/20/2019 11/20/2018    Low Dose Statin 09/05/2019 9/5/2018    Eye Exam 09/07/2019 9/7/2018    Lipid Panel 11/20/2019 11/20/2018    Colonoscopy 05/04/2025 5/4/2015

## 2018-11-27 NOTE — PATIENT INSTRUCTIONS
Lamisil AT is a good cream for the Athlete's foot.     THING REALLY HARD ABOUT QUITTING TOBACCO FOR GOOD

## 2018-12-08 DIAGNOSIS — E11.9 TYPE 2 DIABETES MELLITUS WITHOUT COMPLICATION, WITHOUT LONG-TERM CURRENT USE OF INSULIN: ICD-10-CM

## 2018-12-09 RX ORDER — METFORMIN HYDROCHLORIDE 500 MG/1
TABLET, EXTENDED RELEASE ORAL
Qty: 90 TABLET | Refills: 1 | Status: SHIPPED | OUTPATIENT
Start: 2018-12-09 | End: 2019-06-14 | Stop reason: SDUPTHER

## 2019-06-07 ENCOUNTER — LAB VISIT (OUTPATIENT)
Dept: LAB | Facility: HOSPITAL | Age: 59
End: 2019-06-07
Attending: INTERNAL MEDICINE
Payer: COMMERCIAL

## 2019-06-07 DIAGNOSIS — E11.9 TYPE 2 DIABETES MELLITUS WITHOUT COMPLICATION, WITHOUT LONG-TERM CURRENT USE OF INSULIN: Chronic | ICD-10-CM

## 2019-06-07 DIAGNOSIS — I10 HYPERTENSION, WELL CONTROLLED: Chronic | ICD-10-CM

## 2019-06-07 LAB
ALBUMIN SERPL BCP-MCNC: 4.1 G/DL (ref 3.5–5.2)
ALP SERPL-CCNC: 59 U/L (ref 55–135)
ALT SERPL W/O P-5'-P-CCNC: 31 U/L (ref 10–44)
ANION GAP SERPL CALC-SCNC: 7 MMOL/L (ref 8–16)
AST SERPL-CCNC: 23 U/L (ref 10–40)
BASOPHILS # BLD AUTO: 0.03 K/UL (ref 0–0.2)
BASOPHILS NFR BLD: 0.7 % (ref 0–1.9)
BILIRUB SERPL-MCNC: 1 MG/DL (ref 0.1–1)
BUN SERPL-MCNC: 14 MG/DL (ref 8–23)
CALCIUM SERPL-MCNC: 9.8 MG/DL (ref 8.7–10.5)
CHLORIDE SERPL-SCNC: 102 MMOL/L (ref 95–110)
CHOLEST SERPL-MCNC: 115 MG/DL (ref 120–199)
CHOLEST/HDLC SERPL: 2.8 {RATIO} (ref 2–5)
CO2 SERPL-SCNC: 28 MMOL/L (ref 23–29)
CREAT SERPL-MCNC: 1 MG/DL (ref 0.5–1.4)
DIFFERENTIAL METHOD: ABNORMAL
EOSINOPHIL # BLD AUTO: 0.1 K/UL (ref 0–0.5)
EOSINOPHIL NFR BLD: 3.1 % (ref 0–8)
ERYTHROCYTE [DISTWIDTH] IN BLOOD BY AUTOMATED COUNT: 14.1 % (ref 11.5–14.5)
EST. GFR  (AFRICAN AMERICAN): >60 ML/MIN/1.73 M^2
EST. GFR  (NON AFRICAN AMERICAN): >60 ML/MIN/1.73 M^2
ESTIMATED AVG GLUCOSE: 140 MG/DL (ref 68–131)
GLUCOSE SERPL-MCNC: 142 MG/DL (ref 70–110)
HBA1C MFR BLD HPLC: 6.5 % (ref 4–5.6)
HCT VFR BLD AUTO: 42.4 % (ref 40–54)
HDLC SERPL-MCNC: 41 MG/DL (ref 40–75)
HDLC SERPL: 35.7 % (ref 20–50)
HGB BLD-MCNC: 13.5 G/DL (ref 14–18)
IMM GRANULOCYTES # BLD AUTO: 0.03 K/UL (ref 0–0.04)
IMM GRANULOCYTES NFR BLD AUTO: 0.7 % (ref 0–0.5)
LDLC SERPL CALC-MCNC: 61.6 MG/DL (ref 63–159)
LYMPHOCYTES # BLD AUTO: 1.5 K/UL (ref 1–4.8)
LYMPHOCYTES NFR BLD: 36.2 % (ref 18–48)
MCH RBC QN AUTO: 29 PG (ref 27–31)
MCHC RBC AUTO-ENTMCNC: 31.8 G/DL (ref 32–36)
MCV RBC AUTO: 91 FL (ref 82–98)
MONOCYTES # BLD AUTO: 0.6 K/UL (ref 0.3–1)
MONOCYTES NFR BLD: 14.8 % (ref 4–15)
NEUTROPHILS # BLD AUTO: 1.9 K/UL (ref 1.8–7.7)
NEUTROPHILS NFR BLD: 44.5 % (ref 38–73)
NONHDLC SERPL-MCNC: 74 MG/DL
NRBC BLD-RTO: 0 /100 WBC
PLATELET # BLD AUTO: 219 K/UL (ref 150–350)
PMV BLD AUTO: 11.9 FL (ref 9.2–12.9)
POTASSIUM SERPL-SCNC: 4.3 MMOL/L (ref 3.5–5.1)
PROT SERPL-MCNC: 7.6 G/DL (ref 6–8.4)
RBC # BLD AUTO: 4.66 M/UL (ref 4.6–6.2)
SODIUM SERPL-SCNC: 137 MMOL/L (ref 136–145)
TRIGL SERPL-MCNC: 62 MG/DL (ref 30–150)
WBC # BLD AUTO: 4.2 K/UL (ref 3.9–12.7)

## 2019-06-07 PROCEDURE — 80053 COMPREHEN METABOLIC PANEL: CPT

## 2019-06-07 PROCEDURE — 85025 COMPLETE CBC W/AUTO DIFF WBC: CPT

## 2019-06-07 PROCEDURE — 36415 COLL VENOUS BLD VENIPUNCTURE: CPT | Mod: PO

## 2019-06-07 PROCEDURE — 80061 LIPID PANEL: CPT

## 2019-06-07 PROCEDURE — 83036 HEMOGLOBIN GLYCOSYLATED A1C: CPT

## 2019-06-14 ENCOUNTER — OFFICE VISIT (OUTPATIENT)
Dept: FAMILY MEDICINE | Facility: CLINIC | Age: 59
End: 2019-06-14
Payer: COMMERCIAL

## 2019-06-14 VITALS
OXYGEN SATURATION: 98 % | WEIGHT: 209.88 LBS | SYSTOLIC BLOOD PRESSURE: 132 MMHG | HEIGHT: 67 IN | HEART RATE: 76 BPM | BODY MASS INDEX: 32.94 KG/M2 | TEMPERATURE: 98 F | DIASTOLIC BLOOD PRESSURE: 72 MMHG

## 2019-06-14 DIAGNOSIS — E78.5 HYPERLIPIDEMIA DUE TO TYPE 2 DIABETES MELLITUS: Chronic | ICD-10-CM

## 2019-06-14 DIAGNOSIS — E11.9 TYPE 2 DIABETES MELLITUS WITHOUT COMPLICATION, WITHOUT LONG-TERM CURRENT USE OF INSULIN: ICD-10-CM

## 2019-06-14 DIAGNOSIS — E66.9 OBESITY, CLASS I, BMI 30-34.9: Chronic | ICD-10-CM

## 2019-06-14 DIAGNOSIS — I10 HYPERTENSION, WELL CONTROLLED: Chronic | ICD-10-CM

## 2019-06-14 DIAGNOSIS — E11.69 HYPERLIPIDEMIA DUE TO TYPE 2 DIABETES MELLITUS: Chronic | ICD-10-CM

## 2019-06-14 DIAGNOSIS — Z00.00 ROUTINE MEDICAL EXAM: Primary | ICD-10-CM

## 2019-06-14 DIAGNOSIS — Z72.0 TOBACCO USE: Chronic | ICD-10-CM

## 2019-06-14 PROCEDURE — 99999 PR PBB SHADOW E&M-EST. PATIENT-LVL III: CPT | Mod: PBBFAC,,, | Performed by: INTERNAL MEDICINE

## 2019-06-14 PROCEDURE — 99999 PR PBB SHADOW E&M-EST. PATIENT-LVL III: ICD-10-PCS | Mod: PBBFAC,,, | Performed by: INTERNAL MEDICINE

## 2019-06-14 PROCEDURE — 99396 PR PREVENTIVE VISIT,EST,40-64: ICD-10-PCS | Mod: S$GLB,,, | Performed by: INTERNAL MEDICINE

## 2019-06-14 PROCEDURE — 3075F SYST BP GE 130 - 139MM HG: CPT | Mod: CPTII,S$GLB,, | Performed by: INTERNAL MEDICINE

## 2019-06-14 PROCEDURE — 3078F DIAST BP <80 MM HG: CPT | Mod: CPTII,S$GLB,, | Performed by: INTERNAL MEDICINE

## 2019-06-14 PROCEDURE — 3044F HG A1C LEVEL LT 7.0%: CPT | Mod: CPTII,S$GLB,, | Performed by: INTERNAL MEDICINE

## 2019-06-14 PROCEDURE — 3078F PR MOST RECENT DIASTOLIC BLOOD PRESSURE < 80 MM HG: ICD-10-PCS | Mod: CPTII,S$GLB,, | Performed by: INTERNAL MEDICINE

## 2019-06-14 PROCEDURE — 99396 PREV VISIT EST AGE 40-64: CPT | Mod: S$GLB,,, | Performed by: INTERNAL MEDICINE

## 2019-06-14 PROCEDURE — 3075F PR MOST RECENT SYSTOLIC BLOOD PRESS GE 130-139MM HG: ICD-10-PCS | Mod: CPTII,S$GLB,, | Performed by: INTERNAL MEDICINE

## 2019-06-14 PROCEDURE — 3044F PR MOST RECENT HEMOGLOBIN A1C LEVEL <7.0%: ICD-10-PCS | Mod: CPTII,S$GLB,, | Performed by: INTERNAL MEDICINE

## 2019-06-14 RX ORDER — METFORMIN HYDROCHLORIDE 500 MG/1
500 TABLET, EXTENDED RELEASE ORAL DAILY
Qty: 90 TABLET | Refills: 1 | Status: SHIPPED | OUTPATIENT
Start: 2019-06-14 | End: 2019-12-20 | Stop reason: SDUPTHER

## 2019-06-14 NOTE — PROGRESS NOTES
Assessment & Plan  Problem List Items Addressed This Visit        Cardiac/Vascular    Hypertension, well controlled (Chronic)    Overview     Counseled on data in the [facility] [place] regarding ACE-I.  He would like to stay on his current meds instead of changing to ARB         Current Assessment & Plan     The current medical regimen is effective;  continue present plan and medications.          Hyperlipidemia due to type 2 diabetes mellitus (Chronic)    Overview     10 year CVD risk 23% 09/2018         Current Assessment & Plan     The current medical regimen is effective;  continue present plan and medications.          Relevant Medications    metFORMIN (GLUCOPHAGE-XR) 500 MG 24 hr tablet       Endocrine    Obesity, Class I, BMI 30-34.9 (Chronic)    Current Assessment & Plan     The patient's BMI has been recorded in the chart. The patient has been provided educational materials regarding the benefits of attaining and maintaining a normal weight. We will continue to address and follow this issue during follow up visits.         Type 2 diabetes mellitus without complication, without long-term current use of insulin (Chronic)    Current Assessment & Plan     The current medical regimen is effective;  continue present plan and medications.          Relevant Medications    metFORMIN (GLUCOPHAGE-XR) 500 MG 24 hr tablet    Other Relevant Orders    Hemoglobin A1c    Basic metabolic panel       Other    Tobacco use (Chronic)    Overview     Cigars and chewing tobacco         Current Assessment & Plan     I counseled the patient on tobacco cessation, risks associated with smoking/chewing tobacco, having a quit date, nicotine replacement, medications that can aid in cessation, and having a plan for future cravings.  The patient stated that he is working on cutting back           Other Visit Diagnoses     Routine medical exam    -  Primary  -    Discussed healthy diet, regular exercise, necessary labs, age appropriate cancer screening,  and routine vaccinations.               Health Maintenance reviewed, declined all vaccines.  Is contemplative phase about PCV.    Follow-up: Follow up in about 6 months (around 12/14/2019) for follow up for chronic conditions.    ______________________________________________________________________    Chief Complaint  Chief Complaint   Patient presents with    Annual Exam       HPI  Juvencio Viera is a 59 y.o. male with multiple medical diagnoses as listed in the medical history and problem list that presents for routine physical.  Pt is known to me with his last appointment 11/2108.  He had labs prior to this OV that showed improving CBC, reassuring CMP, controlled lipids and A1c of 6.5%.  Urine Microalbumin/Cr normal.     No acute complaints.  Taking and tolerating medications as prescribed without perceived side effects.  No CP, SOB, palpitations, hypoglycemic symptoms.              PAST MEDICAL HISTORY:  Past Medical History:   Diagnosis Date    Hypertension     Hypertension, well controlled 2/27/2015    Type 2 diabetes mellitus without complication, without long-term current use of insulin 9/5/2018       PAST SURGICAL HISTORY:  Past Surgical History:   Procedure Laterality Date    COLONOSCOPY N/A 5/4/2015    Performed by [facility]ord[facility] at [facility]    NO PAST SURGERIES         SOCIAL HISTORY:  Social History     Socioeconomic History    Marital status: Single     Spouse name: Not on file    Number of children: Not on file    Years of education: Not on file    Highest education level: Not on file   Occupational History    Not on file   Social Needs    Financial resource strain: Not on file    Food insecurity:     Worry: Not on file     Inability: Not on file    Transportation needs:     Medical: Not on file     Non-medical: Not on file   Tobacco Use    Smoking status: Current Every Day Smoker     Packs/day: 1.00     Types: Cigars    Smokeless tobacco: Current User     Types: Snuff    Substance and Sexual Activity    Alcohol use: Yes    Drug use: No    Sexual activity: Yes     Partners: Female   Lifestyle    Physical activity:     Days per week: Not on file     Minutes per session: Not on file    Stress: Not on file   Relationships    Social connections:     Talks on phone: Not on file     Gets together: Not on file     Attends Taoist service: Not on file     Active member of club or organization: Not on file     Attends meetings of clubs or organizations: Not on file     Relationship status: Not on file   Other Topics Concern    Not on file   Social History Narrative    Not on file       FAMILY HISTORY:  Family History   Problem Relation Age of Onset    Hypertension Mother     Diabetes Mother        ALLERGIES AND MEDICATIONS: updated and reviewed.  Review of patient's allergies indicates:  No Known Allergies  Current Outpatient Medications   Medication Sig Dispense Refill    atorvastatin (LIPITOR) 20 MG tablet Take 1 tablet (20 mg total) by mouth once daily. 90 tablet 3    lisinopril (PRINIVIL,ZESTRIL) 20 MG tablet Take 1 tablet (20 mg total) by mouth once daily. 90 tablet 3    metFORMIN (GLUCOPHAGE-XR) 500 MG 24 hr tablet Take 1 tablet (500 mg total) by mouth once daily. 90 tablet 1    blood sugar diagnostic Strp Test twice a day 200 each 11    blood-glucose meter Misc Whatever meter insurance covers 1 each 0    ketoconazole (NIZORAL) 2 % cream Apply topically once daily. 30 g 0    lancets 33 gauge Misc 1 lancet by Misc.(Non-Drug; Combo Route) route 2 (two) times daily. Lancets for a contour meter. Test twice a day 200 each 11     No current facility-administered medications for this visit.          ROS  Review of Systems   Constitutional: Negative for chills, fever and unexpected weight change.   HENT: Negative for congestion, dental problem, ear pain, hearing loss, rhinorrhea, sore throat and trouble swallowing.    Eyes: Negative for discharge, redness and visual  "disturbance.   Respiratory: Negative for cough, chest tightness, shortness of breath and wheezing.    Cardiovascular: Negative for chest pain, palpitations and leg swelling.   Gastrointestinal: Negative for abdominal pain, constipation, diarrhea, nausea and vomiting.   Endocrine: Negative for polydipsia, polyphagia and polyuria.   Genitourinary: Negative for decreased urine volume, dysuria and hematuria.   Musculoskeletal: Negative for arthralgias and myalgias.   Skin: Negative for color change and rash.   Neurological: Negative for dizziness, weakness, light-headedness and headaches.   Psychiatric/Behavioral: Negative for decreased concentration, dysphoric mood, sleep disturbance and suicidal ideas.           Physical Exam  Vitals:    06/14/19 1118 06/14/19 1139   BP: (!) 150/70 132/72   Pulse: 76    Temp: 98.3 °F (36.8 °C)    SpO2: 98%    Weight: 95.2 kg (209 lb 14.1 oz)    Height: 5' 7" (1.702 m)     Body mass index is 32.87 kg/m².  Weight: 95.2 kg (209 lb 14.1 oz)   Height: 5' 7" (170.2 cm)   Physical Exam   Constitutional: He is oriented to person, place, and time. He appears well-developed and well-nourished. No distress.   HENT:   Head: Normocephalic and atraumatic.   Right Ear: Tympanic membrane, external ear and ear canal normal.   Left Ear: Tympanic membrane, external ear and ear canal normal.   Nose: Nose normal. No septal deviation. Right sinus exhibits no maxillary sinus tenderness and no frontal sinus tenderness. Left sinus exhibits no maxillary sinus tenderness and no frontal sinus tenderness.   Mouth/Throat: Uvula is midline, oropharynx is clear and moist and mucous membranes are normal. No tonsillar exudate.   Eyes: Pupils are equal, round, and reactive to light. Conjunctivae and EOM are normal. Right eye exhibits no discharge. Left eye exhibits no discharge. No scleral icterus.   Neck: Neck supple. No JVD present. No spinous process tenderness and no muscular tenderness present. Carotid bruit is " not present. No tracheal deviation present. No thyroid mass and no thyromegaly present.   Cardiovascular: Normal rate, regular rhythm, normal heart sounds and intact distal pulses. Exam reveals no S3, no S4 and no friction rub.   No murmur heard.  Pulmonary/Chest: Effort normal and breath sounds normal. He has no wheezes. He has no rhonchi. He has no rales.   Abdominal: Soft. Bowel sounds are normal. He exhibits no distension. There is no tenderness. There is no rebound, no guarding and no CVA tenderness.   Musculoskeletal: Normal range of motion. He exhibits no edema or tenderness.   Lymphadenopathy:        Head (right side): No submental and no submandibular adenopathy present.        Head (left side): No submental and no submandibular adenopathy present.     He has no cervical adenopathy.   Neurological: He is alert and oriented to person, place, and time. Coordination normal.   Motor grossly intact.  Sensation grossly normal.  Symmetric facial movements palate elevated symmetrically tongue midline    Skin: Skin is warm and dry. Capillary refill takes less than 2 seconds. No rash noted. No cyanosis. Nails show no clubbing.   Psychiatric: He has a normal mood and affect. His speech is normal and behavior is normal. Thought content normal. Cognition and memory are normal.         Health Maintenance       Date Due Completion Date    TETANUS VACCINE 04/14/1976 ---    Pneumococcal Vaccine (Medium Risk) (1 of 1 - PPSV23) 04/14/1977 ---    Shingles Vaccine (1 of 2) 04/14/2008 ---    Influenza Vaccine 10/01/2019 (Originally 8/1/2019) 3/24/2017 (Declined)    Override on 3/24/2017: Declined    Override on 3/11/2016: Declined    Eye Exam 09/07/2019 9/7/2018    Low Dose Statin 11/27/2019 11/27/2018    Foot Exam 11/27/2019 11/27/2018    Hemoglobin A1c 12/07/2019 6/7/2019    Lipid Panel 06/07/2020 6/7/2019    Colonoscopy 05/04/2025 5/4/2015

## 2019-06-14 NOTE — ASSESSMENT & PLAN NOTE
I counseled the patient on tobacco cessation, risks associated with smoking/chewing tobacco, having a quit date, nicotine replacement, medications that can aid in cessation, and having a plan for future cravings.  The patient stated that he is working on cutting back

## 2019-09-27 DIAGNOSIS — E11.69 HYPERLIPIDEMIA DUE TO TYPE 2 DIABETES MELLITUS: ICD-10-CM

## 2019-09-27 DIAGNOSIS — E78.5 HYPERLIPIDEMIA DUE TO TYPE 2 DIABETES MELLITUS: ICD-10-CM

## 2019-09-27 RX ORDER — ATORVASTATIN CALCIUM 20 MG/1
TABLET, FILM COATED ORAL
Qty: 90 TABLET | Refills: 3 | Status: SHIPPED | OUTPATIENT
Start: 2019-09-27 | End: 2019-12-20 | Stop reason: SDUPTHER

## 2019-12-06 DIAGNOSIS — E11.9 TYPE 2 DIABETES MELLITUS WITHOUT COMPLICATION, UNSPECIFIED WHETHER LONG TERM INSULIN USE: ICD-10-CM

## 2019-12-09 ENCOUNTER — PATIENT OUTREACH (OUTPATIENT)
Dept: ADMINISTRATIVE | Facility: HOSPITAL | Age: 59
End: 2019-12-09

## 2019-12-09 DIAGNOSIS — R73.03 PREDIABETES: ICD-10-CM

## 2019-12-09 RX ORDER — BLOOD SUGAR DIAGNOSTIC
STRIP MISCELLANEOUS
Qty: 100 STRIP | Refills: 11 | Status: SHIPPED | OUTPATIENT
Start: 2019-12-09

## 2019-12-13 ENCOUNTER — LAB VISIT (OUTPATIENT)
Dept: LAB | Facility: HOSPITAL | Age: 59
End: 2019-12-13
Attending: INTERNAL MEDICINE
Payer: COMMERCIAL

## 2019-12-13 DIAGNOSIS — E11.9 TYPE 2 DIABETES MELLITUS WITHOUT COMPLICATION, WITHOUT LONG-TERM CURRENT USE OF INSULIN: ICD-10-CM

## 2019-12-13 LAB
ANION GAP SERPL CALC-SCNC: 9 MMOL/L (ref 8–16)
BUN SERPL-MCNC: 14 MG/DL (ref 8–23)
CALCIUM SERPL-MCNC: 9.4 MG/DL (ref 8.7–10.5)
CHLORIDE SERPL-SCNC: 102 MMOL/L (ref 95–110)
CO2 SERPL-SCNC: 26 MMOL/L (ref 23–29)
CREAT SERPL-MCNC: 1 MG/DL (ref 0.5–1.4)
EST. GFR  (AFRICAN AMERICAN): >60 ML/MIN/1.73 M^2
EST. GFR  (NON AFRICAN AMERICAN): >60 ML/MIN/1.73 M^2
ESTIMATED AVG GLUCOSE: 157 MG/DL (ref 68–131)
GLUCOSE SERPL-MCNC: 120 MG/DL (ref 70–110)
HBA1C MFR BLD HPLC: 7.1 % (ref 4–5.6)
POTASSIUM SERPL-SCNC: 4.2 MMOL/L (ref 3.5–5.1)
SODIUM SERPL-SCNC: 137 MMOL/L (ref 136–145)

## 2019-12-13 PROCEDURE — 83036 HEMOGLOBIN GLYCOSYLATED A1C: CPT

## 2019-12-13 PROCEDURE — 80048 BASIC METABOLIC PNL TOTAL CA: CPT

## 2019-12-13 PROCEDURE — 36415 COLL VENOUS BLD VENIPUNCTURE: CPT | Mod: PO

## 2019-12-20 ENCOUNTER — CLINICAL SUPPORT (OUTPATIENT)
Dept: OPHTHALMOLOGY | Facility: CLINIC | Age: 59
End: 2019-12-20
Attending: INTERNAL MEDICINE
Payer: COMMERCIAL

## 2019-12-20 ENCOUNTER — OFFICE VISIT (OUTPATIENT)
Dept: FAMILY MEDICINE | Facility: CLINIC | Age: 59
End: 2019-12-20
Payer: COMMERCIAL

## 2019-12-20 VITALS
HEART RATE: 72 BPM | WEIGHT: 213.19 LBS | OXYGEN SATURATION: 98 % | SYSTOLIC BLOOD PRESSURE: 124 MMHG | HEIGHT: 67 IN | DIASTOLIC BLOOD PRESSURE: 80 MMHG | TEMPERATURE: 98 F | BODY MASS INDEX: 33.46 KG/M2

## 2019-12-20 DIAGNOSIS — Z12.5 SCREENING PSA (PROSTATE SPECIFIC ANTIGEN): ICD-10-CM

## 2019-12-20 DIAGNOSIS — E11.69 HYPERLIPIDEMIA DUE TO TYPE 2 DIABETES MELLITUS: ICD-10-CM

## 2019-12-20 DIAGNOSIS — E11.9 TYPE 2 DIABETES MELLITUS WITHOUT COMPLICATION, WITHOUT LONG-TERM CURRENT USE OF INSULIN: Primary | ICD-10-CM

## 2019-12-20 DIAGNOSIS — J06.9 VIRAL URI: ICD-10-CM

## 2019-12-20 DIAGNOSIS — E78.5 HYPERLIPIDEMIA DUE TO TYPE 2 DIABETES MELLITUS: ICD-10-CM

## 2019-12-20 DIAGNOSIS — I10 HYPERTENSION, WELL CONTROLLED: Chronic | ICD-10-CM

## 2019-12-20 DIAGNOSIS — Z72.0 TOBACCO USE: Chronic | ICD-10-CM

## 2019-12-20 DIAGNOSIS — E11.9 TYPE 2 DIABETES MELLITUS WITHOUT COMPLICATION, UNSPECIFIED WHETHER LONG TERM INSULIN USE: ICD-10-CM

## 2019-12-20 PROCEDURE — 92250 FUNDUS PHOTOGRAPHY W/I&R: CPT | Mod: 26,S$GLB,, | Performed by: OPHTHALMOLOGY

## 2019-12-20 PROCEDURE — 3074F PR MOST RECENT SYSTOLIC BLOOD PRESSURE < 130 MM HG: ICD-10-PCS | Mod: CPTII,S$GLB,, | Performed by: INTERNAL MEDICINE

## 2019-12-20 PROCEDURE — 2022F DILAT RTA XM EVC RTNOPTHY: CPT | Mod: S$GLB,,, | Performed by: OPHTHALMOLOGY

## 2019-12-20 PROCEDURE — 99999 PR PBB SHADOW E&M-EST. PATIENT-LVL III: CPT | Mod: PBBFAC,,, | Performed by: INTERNAL MEDICINE

## 2019-12-20 PROCEDURE — 3079F DIAST BP 80-89 MM HG: CPT | Mod: CPTII,S$GLB,, | Performed by: INTERNAL MEDICINE

## 2019-12-20 PROCEDURE — 2022F DIABETIC EYE SCREENING PHOTO: ICD-10-PCS | Mod: S$GLB,,, | Performed by: OPHTHALMOLOGY

## 2019-12-20 PROCEDURE — 99214 OFFICE O/P EST MOD 30 MIN: CPT | Mod: S$GLB,,, | Performed by: INTERNAL MEDICINE

## 2019-12-20 PROCEDURE — 92250 DIABETIC EYE SCREENING PHOTO: ICD-10-PCS | Mod: 26,S$GLB,, | Performed by: OPHTHALMOLOGY

## 2019-12-20 PROCEDURE — 3008F BODY MASS INDEX DOCD: CPT | Mod: CPTII,S$GLB,, | Performed by: INTERNAL MEDICINE

## 2019-12-20 PROCEDURE — 99999 PR PBB SHADOW E&M-EST. PATIENT-LVL III: ICD-10-PCS | Mod: PBBFAC,,, | Performed by: INTERNAL MEDICINE

## 2019-12-20 PROCEDURE — 3079F PR MOST RECENT DIASTOLIC BLOOD PRESSURE 80-89 MM HG: ICD-10-PCS | Mod: CPTII,S$GLB,, | Performed by: INTERNAL MEDICINE

## 2019-12-20 PROCEDURE — 99214 PR OFFICE/OUTPT VISIT, EST, LEVL IV, 30-39 MIN: ICD-10-PCS | Mod: S$GLB,,, | Performed by: INTERNAL MEDICINE

## 2019-12-20 PROCEDURE — 3074F SYST BP LT 130 MM HG: CPT | Mod: CPTII,S$GLB,, | Performed by: INTERNAL MEDICINE

## 2019-12-20 PROCEDURE — 3008F PR BODY MASS INDEX (BMI) DOCUMENTED: ICD-10-PCS | Mod: CPTII,S$GLB,, | Performed by: INTERNAL MEDICINE

## 2019-12-20 RX ORDER — METFORMIN HYDROCHLORIDE 500 MG/1
1000 TABLET, EXTENDED RELEASE ORAL DAILY
Qty: 180 TABLET | Refills: 1 | Status: SHIPPED | OUTPATIENT
Start: 2019-12-20 | End: 2020-11-13 | Stop reason: SDUPTHER

## 2019-12-20 RX ORDER — LISINOPRIL 20 MG/1
20 TABLET ORAL DAILY
Qty: 90 TABLET | Refills: 1 | Status: SHIPPED | OUTPATIENT
Start: 2019-12-20 | End: 2020-11-13 | Stop reason: SDUPTHER

## 2019-12-20 RX ORDER — ATORVASTATIN CALCIUM 20 MG/1
20 TABLET, FILM COATED ORAL DAILY
Qty: 90 TABLET | Refills: 1 | Status: SHIPPED | OUTPATIENT
Start: 2019-12-20 | End: 2020-11-13 | Stop reason: SDUPTHER

## 2019-12-20 NOTE — PROGRESS NOTES
HPI     60 Y/o here for screening for diabetic retinopathy with non-dilated   fundus photos per Dr. Manuel     Last edited by Victor Hugo Mathews MA on 12/20/2019 10:45 AM. (History)            Assessment /Plan     For exam results, see Encounter Report.    Type 2 diabetes mellitus without complication, unspecified whether long term insulin use  -     Diabetic Eye Screening Photo      Please see Dr. Longo progress note for interpretation

## 2019-12-20 NOTE — PROGRESS NOTES
Assessment & Plan  Problem List Items Addressed This Visit        Cardiac/Vascular    Hypertension, well controlled (Chronic)    Overview     Counseled on data in the [facility] [place] regarding ACE-I.  He would like to stay on his current meds instead of changing to ARB         Current Assessment & Plan     The current medical regimen is effective;  continue present plan and medications.          Relevant Medications    lisinopril (PRINIVIL,ZESTRIL) 20 MG tablet    Other Relevant Orders    CBC auto differential    Lipid panel    Hyperlipidemia due to type 2 diabetes mellitus (Chronic)    Overview     10 year CVD risk 23% 09/2018         Current Assessment & Plan     The current medical regimen is effective;  continue present plan and medications.          Relevant Medications    atorvastatin (LIPITOR) 20 MG tablet    metFORMIN (GLUCOPHAGE-XR) 500 MG 24 hr tablet       Endocrine    Type 2 diabetes mellitus without complication, without long-term current use of insulin - Primary (Chronic)    Current Assessment & Plan     Increase to 1000mg daily.          Relevant Medications    metFORMIN (GLUCOPHAGE-XR) 500 MG 24 hr tablet    Other Relevant Orders    Hemoglobin A1c    Comprehensive metabolic panel    Microalbumin/creatinine urine ratio       Other    Tobacco use (Chronic)    Overview     Cigars and chewing tobacco         Current Assessment & Plan     Reducing his intake.  Keep up the great work!            Other Visit Diagnoses     Viral URI    -  I counseled the patient on fluids, rest, OTC medications that can safely be used, hand/cough hygiene, expected course of illness, and when further medical attention would be warranted.       Screening PSA (prostate specific antigen)    -  Check in 6 months.     Relevant Orders    PSA, Screening            Health Maintenance reviewed, declined all vaccines.  Needs eye cam.    Follow-up: Follow up in about 6 months (around 6/20/2020) for Routine  Physical.    ______________________________________________________________________    Chief Complaint  Chief Complaint   Patient presents with    Hypertension    Diabetes    Sore Throat       HPI  Juvencio Viera is a 59 y.o. male with multiple medical diagnoses as listed in the medical history and problem list that presents for HTN DM follow up.  Pt is known to me with his last appointment 06/2019.  He had labs prior to this OV that showed stable BMP and A1c that has increased 7.1%.    A1c has been trending up since Nov 2018.  Taking and tolerating medications as prescribed without perceived side effects.  No CP, SOB, palpitations, hypoglycemic symptoms.  Home readings mid-day on the weekend are 115-120.     He has been having a cold with sore throat runny nose and cough for about 2-3 days.  No myalgias, F/C/NS, SOB, wheezing, ear pain.  Taking nyquil which doesn't help much.      PAST MEDICAL HISTORY:  Past Medical History:   Diagnosis Date    Hypertension     Hypertension, well controlled 2/27/2015    Type 2 diabetes mellitus without complication, without long-term current use of insulin 9/5/2018       PAST SURGICAL HISTORY:  Past Surgical History:   Procedure Laterality Date    NO PAST SURGERIES         SOCIAL HISTORY:  Social History     Socioeconomic History    Marital status: Single     Spouse name: Not on file    Number of children: Not on file    Years of education: Not on file    Highest education level: Not on file   Occupational History    Not on file   Social Needs    Financial resource strain: Not on file    Food insecurity:     Worry: Not on file     Inability: Not on file    Transportation needs:     Medical: Not on file     Non-medical: Not on file   Tobacco Use    Smoking status: Current Some Day Smoker     Packs/day: 1.00     Types: Cigars    Smokeless tobacco: Current User     Types: Snuff   Substance and Sexual Activity    Alcohol use: Yes     Alcohol/week: 4.0 standard  drinks     Types: 4 Cans of beer per week    Drug use: No    Sexual activity: Yes     Partners: Female   Lifestyle    Physical activity:     Days per week: Not on file     Minutes per session: Not on file    Stress: Not on file   Relationships    Social connections:     Talks on phone: Not on file     Gets together: Not on file     Attends Restorationism service: Not on file     Active member of club or organization: Not on file     Attends meetings of clubs or organizations: Not on file     Relationship status: Not on file   Other Topics Concern    Not on file   Social History Narrative    Not on file       FAMILY HISTORY:  Family History   Problem Relation Age of Onset    Hypertension Mother     Diabetes Mother        ALLERGIES AND MEDICATIONS: updated and reviewed.  Review of patient's allergies indicates:  No Known Allergies  Current Outpatient Medications   Medication Sig Dispense Refill    atorvastatin (LIPITOR) 20 MG tablet Take 1 tablet (20 mg total) by mouth once daily. 90 tablet 1    lisinopril (PRINIVIL,ZESTRIL) 20 MG tablet Take 1 tablet (20 mg total) by mouth once daily. 90 tablet 1    metFORMIN (GLUCOPHAGE-XR) 500 MG 24 hr tablet Take 2 tablets (1,000 mg total) by mouth once daily. 180 tablet 1    blood-glucose meter Misc Whatever meter insurance covers (Patient not taking: Reported on 12/20/2019) 1 each 0    CONTOUR NEXT TEST STRIPS Strp USE  STRIP TO CHECK GLUCOSE TWICE DAILY (Patient not taking: Reported on 12/20/2019) 100 strip 11    ketoconazole (NIZORAL) 2 % cream Apply topically once daily. (Patient not taking: Reported on 12/20/2019) 30 g 0    lancets 33 gauge Misc 1 lancet by Misc.(Non-Drug; Combo Route) route 2 (two) times daily. Lancets for a contour meter. Test twice a day (Patient not taking: Reported on 12/20/2019) 200 each 11     No current facility-administered medications for this visit.          ROS  Review of Systems   Constitutional: Negative for chills, fever and  "unexpected weight change.   HENT: Positive for congestion and sore throat. Negative for dental problem, ear pain, hearing loss, rhinorrhea and trouble swallowing.    Eyes: Negative for discharge, redness and visual disturbance.   Respiratory: Positive for cough. Negative for chest tightness, shortness of breath and wheezing.    Cardiovascular: Negative for chest pain, palpitations and leg swelling.   Gastrointestinal: Negative for abdominal pain, constipation, diarrhea, nausea and vomiting.   Endocrine: Negative for polydipsia, polyphagia and polyuria.   Genitourinary: Negative for decreased urine volume, dysuria and hematuria.   Musculoskeletal: Negative for arthralgias and myalgias.   Skin: Negative for color change and rash.   Neurological: Negative for dizziness, weakness, light-headedness and headaches.   Psychiatric/Behavioral: Negative for decreased concentration, dysphoric mood, sleep disturbance and suicidal ideas.           Physical Exam  Vitals:    12/20/19 0918   BP: 124/80   Pulse: 72   Temp: 98.3 °F (36.8 °C)   SpO2: 98%   Weight: 96.7 kg (213 lb 3 oz)   Height: 5' 7" (1.702 m)    Body mass index is 33.39 kg/m².  Weight: 96.7 kg (213 lb 3 oz)   Height: 5' 7" (170.2 cm)   Physical Exam   Constitutional: He is oriented to person, place, and time. He appears well-developed and well-nourished. No distress.   HENT:   Head: Normocephalic and atraumatic.   Right Ear: Tympanic membrane, external ear and ear canal normal.   Left Ear: Tympanic membrane, external ear and ear canal normal.   Nose: Nose normal.   Mouth/Throat: Uvula is midline and mucous membranes are normal. Posterior oropharyngeal erythema present. No oropharyngeal exudate, posterior oropharyngeal edema or tonsillar abscesses. No tonsillar exudate.   Eyes: Pupils are equal, round, and reactive to light. Conjunctivae, EOM and lids are normal. No scleral icterus.   Neck: Full passive range of motion without pain. Neck supple. No JVD present. " Carotid bruit is not present. No thyromegaly present.   Cardiovascular: Normal rate, regular rhythm, normal heart sounds and intact distal pulses. Exam reveals no S3, no S4 and no friction rub.   No murmur heard.  Pulmonary/Chest: Effort normal and breath sounds normal. He has no wheezes. He has no rhonchi. He has no rales.   Abdominal: Soft. Bowel sounds are normal. There is no tenderness.   Musculoskeletal: He exhibits no edema or tenderness.   Lymphadenopathy:        Head (right side): No submental and no submandibular adenopathy present.        Head (left side): No submental and no submandibular adenopathy present.     He has no cervical adenopathy.   Neurological: He is alert and oriented to person, place, and time.   Motor grossly intact.  Sensory grossly intact.  Symmetric facial movements palate elevated symmetrically tongue midline   Skin: Skin is warm and dry. No rash noted.   Psychiatric: He has a normal mood and affect. His speech is normal and behavior is normal. Thought content normal.       Protective Sensation (w/ 10 gram monofilament):  Right: Intact  Left: Intact    Visual Inspection:  Callus -  Bilateral    Pedal Pulses:   Right: Present  Left: Present    Posterior tibialis:   Right:Present  Left: Present      Health Maintenance       Date Due Completion Date    TETANUS VACCINE 04/14/1976 ---    Pneumococcal Vaccine (Medium Risk) (1 of 1 - PPSV23) 04/14/1977 ---    Shingles Vaccine (1 of 2) 04/14/2008 ---    Influenza Vaccine (1) 09/01/2019 ---    Eye Exam 09/07/2019 9/7/2018    Foot Exam 11/27/2019 11/27/2018    Lipid Panel 06/07/2020 6/7/2019    Hemoglobin A1c 06/13/2020 12/13/2019    Low Dose Statin 12/20/2020 12/20/2019    Colonoscopy 05/04/2025 5/4/2015

## 2019-12-30 ENCOUNTER — TELEPHONE (OUTPATIENT)
Dept: OPHTHALMOLOGY | Facility: CLINIC | Age: 59
End: 2019-12-30

## 2020-04-23 ENCOUNTER — NURSE TRIAGE (OUTPATIENT)
Dept: ADMINISTRATIVE | Facility: CLINIC | Age: 60
End: 2020-04-23

## 2020-04-23 ENCOUNTER — LAB VISIT (OUTPATIENT)
Dept: FAMILY MEDICINE | Facility: CLINIC | Age: 60
End: 2020-04-23
Payer: COMMERCIAL

## 2020-04-23 DIAGNOSIS — Z20.822 CLOSE EXPOSURE TO COVID-19 VIRUS: Primary | ICD-10-CM

## 2020-04-23 DIAGNOSIS — R05.9 COUGH: ICD-10-CM

## 2020-04-23 DIAGNOSIS — Z20.822 CLOSE EXPOSURE TO COVID-19 VIRUS: ICD-10-CM

## 2020-04-23 PROCEDURE — U0002 COVID-19 LAB TEST NON-CDC: HCPCS

## 2020-04-23 NOTE — TELEPHONE ENCOUNTER
Order placed, please schedule him an appointment at [facility] for testing. Also, please schedule him a virtual visit next week with Dr. Manuel for follow-up. Be sure he knows to go to ER for worsening shortness of breath or other worsening symptoms.    Thanks!  RACHAEL Mancuso

## 2020-04-23 NOTE — TELEPHONE ENCOUNTER
Pt states his wife was tested positive for COVID -19 on 4/17/20. Pt c/o cough during his sleep and diarrhea/abd pain a few days ago. Denies fever and SOB. Dr. Brice Manuel is pt's PCP and they just called his office, but he was redirected to [facility]. [facility] and recommended to see PCP. [facility]. Walked pt through [employer] [facility] set up. Contacted PES agent for primary care virtual appt set up. Warm transferred pt over to PES agent.     Reason for Disposition   [1] Fever (or feeling feverish) OR cough AND [2] within 14 Days of COVID-19 EXPOSURE (Close Contact)    Additional Information   Negative: SEVERE difficulty breathing (e.g., struggling for each breath, speak in single words, bluish lips)   Negative: Sounds like a life-threatening emergency to the triager   Negative: [1] Adult has symptoms of COVID-19 (fever, cough, or SOB) AND [2] lab test positive   Negative: [1] Adult has symptoms of COVID-19 (fever, cough or SOB) AND [2] major community spread where patient lives AND [3] testing not being done for mild symptoms   Negative: [1] Difficulty breathing (shortness of breath) occurs AND [2] onset > 14 days after COVID-19 EXPOSURE (Close Contact) AND [3] no major community spread   Negative: [1] Cough occurs AND [2] onset > 14 days after COVID-19 EXPOSURE AND [3] no major community spread   Negative: [1] Common cold symptoms AND [2] onset > 14 days after COVID-19 EXPOSURE AND [3] no major community spread   Negative: [1] Difficulty breathing occurs AND [2] within 14 days of COVID-19 EXPOSURE (Close Contact)   Negative: Patient sounds very sick or weak to the triager    Protocols used: CORONAVIRUS (COVID-19) EXPOSURE-A-[place]

## 2020-04-24 ENCOUNTER — PATIENT MESSAGE (OUTPATIENT)
Dept: FAMILY MEDICINE | Facility: CLINIC | Age: 60
End: 2020-04-24

## 2020-04-24 LAB — SARS-COV-2 RNA RESP QL NAA+PROBE: NOT DETECTED

## 2020-04-27 ENCOUNTER — TELEPHONE (OUTPATIENT)
Dept: FAMILY MEDICINE | Facility: CLINIC | Age: 60
End: 2020-04-27

## 2020-04-28 ENCOUNTER — TELEPHONE (OUTPATIENT)
Dept: FAMILY MEDICINE | Facility: CLINIC | Age: 60
End: 2020-04-28

## 2020-04-28 ENCOUNTER — OFFICE VISIT (OUTPATIENT)
Dept: FAMILY MEDICINE | Facility: CLINIC | Age: 60
End: 2020-04-28
Payer: COMMERCIAL

## 2020-04-28 DIAGNOSIS — I10 HYPERTENSION, WELL CONTROLLED: Chronic | ICD-10-CM

## 2020-04-28 DIAGNOSIS — Z20.822 CLOSE EXPOSURE TO COVID-19 VIRUS: Primary | ICD-10-CM

## 2020-04-28 DIAGNOSIS — E11.9 TYPE 2 DIABETES MELLITUS WITHOUT COMPLICATION, WITHOUT LONG-TERM CURRENT USE OF INSULIN: Chronic | ICD-10-CM

## 2020-04-28 PROCEDURE — 2024F 7 FLD RTA PHOTO EVC RTNOPTHY: CPT | Mod: ,,, | Performed by: INTERNAL MEDICINE

## 2020-04-28 PROCEDURE — 3051F PR MOST RECENT HEMOGLOBIN A1C LEVEL 7.0 - < 8.0%: ICD-10-PCS | Mod: CPTII,,, | Performed by: INTERNAL MEDICINE

## 2020-04-28 PROCEDURE — 99213 OFFICE O/P EST LOW 20 MIN: CPT | Mod: 95,,, | Performed by: INTERNAL MEDICINE

## 2020-04-28 PROCEDURE — 3051F HG A1C>EQUAL 7.0%<8.0%: CPT | Mod: CPTII,,, | Performed by: INTERNAL MEDICINE

## 2020-04-28 PROCEDURE — 2024F PR 7 FIELD PHOTOS WITH INTERP/ REVIEW: ICD-10-PCS | Mod: ,,, | Performed by: INTERNAL MEDICINE

## 2020-04-28 PROCEDURE — 99213 PR OFFICE/OUTPT VISIT, EST, LEVL III, 20-29 MIN: ICD-10-PCS | Mod: 95,,, | Performed by: INTERNAL MEDICINE

## 2020-04-28 NOTE — TELEPHONE ENCOUNTER
----- Message from Lisa Mendieta sent at 4/28/2020 10:58 AM CDT -----  Contact: pt's wife adrianna [phone]  Type: Patient Call Back    Who called:pt's wife adrianna [phone]    What is the request in detail:pt is requesting work letter to be sent via email [email or web address] Call pt    Can the clinic reply by [facility]?    Would the patient rather a call back or a response via [facility]? call    Best call back number:pt's wife adrianna [phone]    Additional Information:

## 2020-04-28 NOTE — PROGRESS NOTES
Assessment & Plan  Problem List Items Addressed This Visit        Cardiac/Vascular    Hypertension, well controlled (Chronic)    Overview     Counseled on data in the [facility] regarding ACE-I.  He would like to stay on his current meds instead of changing to ARB         Current Assessment & Plan     The current medical regimen is effective;  continue present plan and medications.             Endocrine    Type 2 diabetes mellitus without complication, without long-term current use of insulin (Chronic)    Current Assessment & Plan     The current medical regimen is effective;  continue present plan and medications. Due for labs in 2 months.            Other Visit Diagnoses     Close Exposure to Covid-19 Virus    -  Primary -   COVID negative.  I counseled the patient on the information that was available from the [facility][note 3] and [facility] at the time of this visit.  Secifically we discussed monitoring for fever, hand hygiene, cough hygiene, social distancing, and when additional medical attention would be warranted.  He us currently on vacation.  Return to work letter provided for Monday.             Health Maintenance reviewed, deferred to physical in 2 months.     The patient location is:  Patient Home   The chief complaint leading to consultation is: noted below  Visit type: Virtual visit with synchronous audio and video  Total time spent with patient: 10  Each patient to whom he or she provides medical services by telemedicine is:  (1) informed of the relationship between the physician and patient and the respective role of any other health care provider with respect to management of the patient; and (2) notified that he or she may decline to receive medical services by telemedicine and may withdraw from such care at any time.    Follow-up: Follow up for Routine Physical as previously scheduled.    ______________________________________________________________________    Chief Complaint  Chief Complaint   Patient presents with  "   COVID-19 Concerns       HPI  Juvencio Viera is a 60 y.o. male with multiple medical diagnoses as listed in the medical history and problem list that presents for COVID concerns via virtual visit.  Pt is known to me with his last appointment 12/20/2019.      His wife had tested positive.  He never had symptoms.  Got tested on 4/23 and this returned negative.      Taking and tolerating medications as prescribed without perceived side effects.  No CP, SOB, palpitations, hypoglycemic symptoms.  Reports BP and sugars are "good."      PAST MEDICAL HISTORY:  Past Medical History:   Diagnosis Date    Hypertension     Hypertension, well controlled 2/27/2015    Type 2 diabetes mellitus without complication, without long-term current use of insulin 9/5/2018       PAST SURGICAL HISTORY:  Past Surgical History:   Procedure Laterality Date    NO PAST SURGERIES         SOCIAL HISTORY:  Social History     Socioeconomic History    Marital status: Single     Spouse name: Not on file    Number of children: Not on file    Years of education: Not on file    Highest education level: Not on file   Occupational History    Not on file   Social Needs    Financial resource strain: Not on file    Food insecurity:     Worry: Not on file     Inability: Not on file    Transportation needs:     Medical: Not on file     Non-medical: Not on file   Tobacco Use    Smoking status: Current Some Day Smoker     Packs/day: 1.00     Types: Cigars    Smokeless tobacco: Current User     Types: Snuff   Substance and Sexual Activity    Alcohol use: Yes     Alcohol/week: 4.0 standard drinks     Types: 4 Cans of beer per week    Drug use: No    Sexual activity: Yes     Partners: Female   Lifestyle    Physical activity:     Days per week: Not on file     Minutes per session: Not on file    Stress: Not on file   Relationships    Social connections:     Talks on phone: Not on file     Gets together: Not on file     Attends [employer] " service: Not on file     Active member of club or organization: Not on file     Attends meetings of clubs or organizations: Not on file     Relationship status: Not on file   Other Topics Concern    Not on file   Social History Narrative    Not on file       FAMILY HISTORY:  Family History   Problem Relation Age of Onset    Hypertension Mother     Diabetes Mother        ALLERGIES AND MEDICATIONS: updated and reviewed.  Review of patient's allergies indicates:  No Known Allergies  Current Outpatient Medications   Medication Sig Dispense Refill    atorvastatin (LIPITOR) 20 MG tablet Take 1 tablet (20 mg total) by mouth once daily. 90 tablet 1    blood-glucose meter Misc Whatever meter insurance covers (Patient not taking: Reported on 12/20/2019) 1 each 0    CONTOUR NEXT TEST STRIPS Strp USE  STRIP TO CHECK GLUCOSE TWICE DAILY (Patient not taking: Reported on 12/20/2019) 100 strip 11    ketoconazole (NIZORAL) 2 % cream Apply topically once daily. (Patient not taking: Reported on 12/20/2019) 30 g 0    lancets 33 gauge Misc 1 lancet by Misc.(Non-Drug; Combo Route) route 2 (two) times daily. Lancets for a contour meter. Test twice a day (Patient not taking: Reported on 12/20/2019) 200 each 11    lisinopril (PRINIVIL,ZESTRIL) 20 MG tablet Take 1 tablet (20 mg total) by mouth once daily. 90 tablet 1    metFORMIN (GLUCOPHAGE-XR) 500 MG 24 hr tablet Take 2 tablets (1,000 mg total) by mouth once daily. 180 tablet 1     No current facility-administered medications for this visit.          ROS  Review of Systems   Constitutional: Negative for chills, fever and unexpected weight change.   HENT: Negative for congestion, dental problem, ear pain, hearing loss, rhinorrhea, sore throat and trouble swallowing.    Eyes: Negative for discharge, redness and visual disturbance.   Respiratory: Negative for cough, chest tightness, shortness of breath and wheezing.    Cardiovascular: Negative for chest pain, palpitations and leg  swelling.   Gastrointestinal: Negative for abdominal pain, constipation, diarrhea, nausea and vomiting.   Endocrine: Negative for polydipsia, polyphagia and polyuria.   Genitourinary: Negative for decreased urine volume, dysuria and hematuria.   Musculoskeletal: Negative for arthralgias and myalgias.   Skin: Negative for color change and rash.   Neurological: Negative for dizziness, weakness, light-headedness and headaches.   Psychiatric/Behavioral: Negative for decreased concentration, dysphoric mood, sleep disturbance and suicidal ideas.           Physical Exam  Physical Exam   Constitutional: He is oriented to person, place, and time. He appears well-developed and well-nourished. No distress.   HENT:   Head: Normocephalic and atraumatic.   Eyes: Conjunctivae and EOM are normal.   Neck: Normal range of motion.   Pulmonary/Chest: Effort normal. No respiratory distress.   Neurological: He is alert and oriented to person, place, and time.   Psychiatric: He has a normal mood and affect. His behavior is normal. Judgment and thought content normal.         Health Maintenance       Date Due Completion Date    HIV Screening 04/14/1973 ---    PROSTATE-SPECIFIC ANTIGEN 08/03/2019 8/3/2018    TETANUS VACCINE 12/20/2020 (Originally 4/14/1976) ---    Pneumococcal Vaccine (Medium Risk) (1 of 1 - PPSV23) 12/20/2020 (Originally 4/14/1977) ---    Shingles Vaccine (1 of 2) 12/20/2020 (Originally 4/14/2008) ---    Influenza Vaccine (1) 12/20/2020 (Originally 9/1/2019) ---    Lipid Panel 06/07/2020 6/7/2019    Hemoglobin A1c 06/13/2020 12/13/2019    Low Dose Statin 12/20/2020 12/20/2019    Foot Exam 12/20/2020 12/20/2019    Eye Exam 12/20/2020 12/20/2019    Colonoscopy 05/04/2025 5/4/2015

## 2020-04-28 NOTE — LETTER
[address] [facility] ? [place] [address] ? Phone [phone] ? Fax [phone] ? [email or web address]          Return to Work/School    Patient: Juvencio Viera  YOB: 1960   Date: 04/28/2020      To Whom It May Concern:     Juvencio Viera was in contact with/seen in my office on 04/28/2020. COVID-19 is present in our communities across the state. There is limited testing for COVID at this time, so not all patients can be tested. In this situation, your employee meets the following criteria:     Juvencio Viera has met the criteria for COVID-19 testing and has a NEGATIVE result. The employee can return to work once they are asymptomatic for 72 hours without the use of fever reducing medications (Tylenol, Motrin, etc).    He may return Monday, May 4th, 2020.  Patient never had symptoms.  He was tested due to an exposure with a family member.      If you have any questions or concerns, or if I can be of further assistance, please do not hesitate to contact me.     Sincerely,    Brice Manuel MD

## 2020-04-28 NOTE — ASSESSMENT & PLAN NOTE
The current medical regimen is effective;  continue present plan and medications. Due for labs in 2 months.

## 2020-09-25 DIAGNOSIS — E11.9 TYPE 2 DIABETES MELLITUS WITHOUT COMPLICATION: ICD-10-CM

## 2020-10-07 ENCOUNTER — HOSPITAL ENCOUNTER (EMERGENCY)
Facility: HOSPITAL | Age: 60
Discharge: HOME | End: 2020-10-07
Attending: EMERGENCY MEDICINE
Payer: COMMERCIAL

## 2020-10-07 VITALS
HEIGHT: 68 IN | OXYGEN SATURATION: 99 % | WEIGHT: 210 LBS | RESPIRATION RATE: 18 BRPM | BODY MASS INDEX: 31.83 KG/M2 | DIASTOLIC BLOOD PRESSURE: 77 MMHG | TEMPERATURE: 98 F | HEART RATE: 57 BPM | SYSTOLIC BLOOD PRESSURE: 148 MMHG

## 2020-10-07 DIAGNOSIS — M54.50 ACUTE BILATERAL LOW BACK PAIN WITHOUT SCIATICA: ICD-10-CM

## 2020-10-07 DIAGNOSIS — M54.2 NECK PAIN: ICD-10-CM
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
## 2020-10-07 DIAGNOSIS — S12.201A CLOSED NONDISPLACED FRACTURE OF THIRD CERVICAL VERTEBRA, UNSPECIFIED FRACTURE MORPHOLOGY, INITIAL ENCOUNTER: Primary | ICD-10-CM

## 2020-10-07 DIAGNOSIS — R51.9 ACUTE NONINTRACTABLE HEADACHE, UNSPECIFIED HEADACHE TYPE: ICD-10-CM

## 2020-10-07 PROCEDURE — 25000003 PHARM REV CODE 250: Performed by: PHYSICIAN ASSISTANT

## 2020-10-07 PROCEDURE — 96372 THER/PROPH/DIAG INJ SC/IM: CPT

## 2020-10-07 PROCEDURE — 63600175 PHARM REV CODE 636 W HCPCS: Performed by: PHYSICIAN ASSISTANT

## 2020-10-07 PROCEDURE — 99284 EMERGENCY DEPT VISIT MOD MDM: CPT | Mod: 25

## 2020-10-07 RX ORDER — LIDOCAINE 50 MG/G
1 PATCH TOPICAL
Status: DISCONTINUED | OUTPATIENT
Start: 2020-10-07 | End: 2020-10-07 | Stop reason: HOSPADM

## 2020-10-07 RX ORDER — ORPHENADRINE CITRATE 30 MG/ML
60 INJECTION INTRAMUSCULAR; INTRAVENOUS
Status: COMPLETED | OUTPATIENT
Start: 2020-10-07 | End: 2020-10-07

## 2020-10-07 RX ORDER — KETOROLAC TROMETHAMINE 30 MG/ML
30 INJECTION, SOLUTION INTRAMUSCULAR; INTRAVENOUS
Status: COMPLETED | OUTPATIENT
Start: 2020-10-07 | End: 2020-10-07

## 2020-10-07 RX ORDER — LIDOCAINE 50 MG/G
1 PATCH TOPICAL DAILY
Qty: 15 PATCH | Refills: 0 | Status: SHIPPED | OUTPATIENT
Start: 2020-10-07 | End: 2020-11-13

## 2020-10-07 RX ORDER — CYCLOBENZAPRINE HCL 10 MG
10 TABLET ORAL 3 TIMES DAILY PRN
Qty: 15 TABLET | Refills: 0 | Status: SHIPPED | OUTPATIENT
Start: 2020-10-07 | End: 2020-10-12

## 2020-10-07 RX ORDER — IBUPROFEN 600 MG/1
600 TABLET ORAL EVERY 6 HOURS PRN
Qty: 20 TABLET | Refills: 0 | Status: SHIPPED | OUTPATIENT
Start: 2020-10-07 | End: 2020-11-13

## 2020-10-07 RX ADMIN — ORPHENADRINE CITRATE 60 MG: 30 INJECTION INTRAMUSCULAR; INTRAVENOUS at 03:10

## 2020-10-07 RX ADMIN — LIDOCAINE 1 PATCH: 50 PATCH TOPICAL at 03:10

## 2020-10-07 RX ADMIN — KETOROLAC TROMETHAMINE 30 MG: 30 INJECTION, SOLUTION INTRAMUSCULAR; INTRAVENOUS at 03:10
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
## 2020-10-07 NOTE — DISCHARGE INSTRUCTIONS
Please take new medication as directed and follow discharge instructions provided.  Please make sure to follow-up with neurosurgery tomorrow morning for your scheduled appointment to discuss today's emergency department visit and for further evaluation and management.  Please make sure the wear your cervical collar until cleared by Neurosurgery.  Please return emergency department immediately if your symptoms worsen or you develop any additional concerning symptoms.

## 2020-10-08 ENCOUNTER — OFFICE VISIT (OUTPATIENT)
Dept: NEUROSURGERY | Facility: CLINIC | Age: 60
End: 2020-10-08
Payer: COMMERCIAL

## 2020-10-08 ENCOUNTER — HOSPITAL ENCOUNTER (OUTPATIENT)
Dept: RADIOLOGY | Facility: HOSPITAL | Age: 60
Discharge: HOME | End: 2020-10-08
Attending: PHYSICIAN ASSISTANT
Payer: COMMERCIAL

## 2020-10-08 VITALS
HEIGHT: 68 IN | SYSTOLIC BLOOD PRESSURE: 144 MMHG | BODY MASS INDEX: 32.24 KG/M2 | DIASTOLIC BLOOD PRESSURE: 71 MMHG | HEART RATE: 62 BPM | WEIGHT: 212.75 LBS
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
## 2020-10-08 DIAGNOSIS — M54.9 BACK PAIN, UNSPECIFIED BACK LOCATION, UNSPECIFIED BACK PAIN LATERALITY, UNSPECIFIED CHRONICITY: ICD-10-CM

## 2020-10-08 DIAGNOSIS — S12.291D OTHER CLOSED NONDISPLACED FRACTURE OF THIRD CERVICAL VERTEBRA WITH ROUTINE HEALING, SUBSEQUENT ENCOUNTER: ICD-10-CM

## 2020-10-08 DIAGNOSIS — M54.2 CERVICALGIA: ICD-10-CM

## 2020-10-08 DIAGNOSIS — M43.27 FUSION OF SPINE, LUMBOSACRAL REGION: ICD-10-CM

## 2020-10-08 DIAGNOSIS — S12.291D OTHER CLOSED NONDISPLACED FRACTURE OF THIRD CERVICAL VERTEBRA WITH ROUTINE HEALING, SUBSEQUENT ENCOUNTER: Primary | ICD-10-CM

## 2020-10-08 PROCEDURE — 3077F SYST BP >= 140 MM HG: CPT | Mod: CPTII,S$GLB,, | Performed by: PHYSICIAN ASSISTANT

## 2020-10-08 PROCEDURE — 72100 X-RAY EXAM L-S SPINE 2/3 VWS: CPT | Mod: TC,FY

## 2020-10-08 PROCEDURE — 99999 PR PBB SHADOW E&M-EST. PATIENT-LVL IV: CPT | Mod: PBBFAC,,, | Performed by: PHYSICIAN ASSISTANT

## 2020-10-08 PROCEDURE — 72040 X-RAY EXAM NECK SPINE 2-3 VW: CPT | Mod: 26,,, | Performed by: RADIOLOGY

## 2020-10-08 PROCEDURE — 72040 XR CERVICAL SPINE FLEXION  AND EXTENSION ONLY: ICD-10-PCS | Mod: 26,,, | Performed by: RADIOLOGY

## 2020-10-08 PROCEDURE — 3008F BODY MASS INDEX DOCD: CPT | Mod: CPTII,S$GLB,, | Performed by: PHYSICIAN ASSISTANT

## 2020-10-08 PROCEDURE — 3078F PR MOST RECENT DIASTOLIC BLOOD PRESSURE < 80 MM HG: ICD-10-PCS | Mod: CPTII,S$GLB,, | Performed by: PHYSICIAN ASSISTANT

## 2020-10-08 PROCEDURE — 3077F PR MOST RECENT SYSTOLIC BLOOD PRESSURE >= 140 MM HG: ICD-10-PCS | Mod: CPTII,S$GLB,, | Performed by: PHYSICIAN ASSISTANT

## 2020-10-08 PROCEDURE — 99204 PR OFFICE/OUTPT VISIT, NEW, LEVL IV, 45-59 MIN: ICD-10-PCS | Mod: S$GLB,,, | Performed by: PHYSICIAN ASSISTANT

## 2020-10-08 PROCEDURE — 3078F DIAST BP <80 MM HG: CPT | Mod: CPTII,S$GLB,, | Performed by: PHYSICIAN ASSISTANT

## 2020-10-08 PROCEDURE — 99999 PR PBB SHADOW E&M-EST. PATIENT-LVL IV: ICD-10-PCS | Mod: PBBFAC,,, | Performed by: PHYSICIAN ASSISTANT

## 2020-10-08 PROCEDURE — 72040 X-RAY EXAM NECK SPINE 2-3 VW: CPT | Mod: TC,FY

## 2020-10-08 PROCEDURE — 99204 OFFICE O/P NEW MOD 45 MIN: CPT | Mod: S$GLB,,, | Performed by: PHYSICIAN ASSISTANT

## 2020-10-08 PROCEDURE — 3008F PR BODY MASS INDEX (BMI) DOCUMENTED: ICD-10-PCS | Mod: CPTII,S$GLB,, | Performed by: PHYSICIAN ASSISTANT

## 2020-10-08 PROCEDURE — 72100 XR LUMBAR SPINE AP AND LATERAL: ICD-10-PCS | Mod: 26,,, | Performed by: RADIOLOGY

## 2020-10-08 PROCEDURE — 72100 X-RAY EXAM L-S SPINE 2/3 VWS: CPT | Mod: 26,,, | Performed by: RADIOLOGY
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
## 2020-10-08 NOTE — LETTER
October 9, 2020      Josef Corley PA-C  [address]  [facility] [address]           [facility]  [address]   [facility] [address]  Phone: [phone]  Fax: [phone]          Patient: Juvencio Viera   MR Number: [id number]   YOB: 1960   Date of Visit: 10/8/2020       Dear Josef Corley:    Thank you for referring Juvencio Viera to me for evaluation. Attached you will find relevant portions of my assessment and plan of care.    If you have questions, please do not hesitate to call me. I look forward to following Juvencio Viera along with you.    Sincerely,    Valery Schmitz PA-C    Enclosure  CC:  No Recipients    If you would like to receive this communication electronically, please contact [email or web address] or [phone] to request more information on [employer] Link access.    For providers and/or their staff who would like to refer a patient to [facility], please contact us through our one-stop-shop provider referral line, [facility] , at [phone].    If you feel you have received this communication in error or would no longer like to receive these types of communications, please e-mail [email or web address]

## 2020-10-14 ENCOUNTER — TELEPHONE (OUTPATIENT)
Dept: NEUROSURGERY | Facility: CLINIC | Age: 60
End: 2020-10-14

## 2020-10-14 NOTE — TELEPHONE ENCOUNTER
----- Message from Valery Schmitz PA-C sent at 10/14/2020 10:13 AM CDT -----  Regarding: RE: Results  Sorry, I thought I sent it. No instability at the level of the fracture. We are on track to discontinue the collar, just need to wait for the MRI results.     Brenda  ----- Message -----  From: Eboni Merida MA  Sent: 10/14/2020   9:56 AM CDT  To: Valery Schmitz PA-C  Subject: FW: Results                                      Pt requesting results from xray done on 10/8.   ----- Message -----  From: Seema Melendez  Sent: 10/14/2020   9:51 AM CDT  To: Nadir Rasmussen Staff  Subject: Results                                          Type:  Test Results    Who Called: adrianna (wife)    Name of Test (Lab/Mammo/Etc):  X ray     Date of Test: 10/8     Can the clinic reply in [facility]:  AMINATA    Best Call Back Number:  [phone]    Additional Information:

## 2020-10-16 ENCOUNTER — HOSPITAL ENCOUNTER (OUTPATIENT)
Dept: RADIOLOGY | Facility: HOSPITAL | Age: 60
Discharge: HOME | End: 2020-10-16
Attending: PHYSICIAN ASSISTANT
Payer: COMMERCIAL

## 2020-10-16 ENCOUNTER — TELEPHONE (OUTPATIENT)
Dept: NEUROSURGERY | Facility: HOSPITAL | Age: 60
End: 2020-10-16
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
## 2020-10-16 DIAGNOSIS — M54.9 BACK PAIN, UNSPECIFIED BACK LOCATION, UNSPECIFIED BACK PAIN LATERALITY, UNSPECIFIED CHRONICITY: ICD-10-CM

## 2020-10-16 DIAGNOSIS — M54.2 CERVICALGIA: ICD-10-CM

## 2020-10-16 DIAGNOSIS — S12.291D OTHER CLOSED NONDISPLACED FRACTURE OF THIRD CERVICAL VERTEBRA WITH ROUTINE HEALING, SUBSEQUENT ENCOUNTER: ICD-10-CM

## 2020-10-16 PROCEDURE — 72141 MRI CERVICAL SPINE WITHOUT CONTRAST: ICD-10-PCS | Mod: 26,,, | Performed by: RADIOLOGY

## 2020-10-16 PROCEDURE — 72120 X-RAY BEND ONLY L-S SPINE: CPT | Mod: 26,,, | Performed by: RADIOLOGY

## 2020-10-16 PROCEDURE — 72120 XR LUMBAR SPINE FLEXION AND EXTENSION ONLY: ICD-10-PCS | Mod: 26,,, | Performed by: RADIOLOGY

## 2020-10-16 PROCEDURE — 72120 X-RAY BEND ONLY L-S SPINE: CPT | Mod: TC,FY,PO

## 2020-10-16 PROCEDURE — 72141 MRI NECK SPINE W/O DYE: CPT | Mod: 26,,, | Performed by: RADIOLOGY

## 2020-10-16 PROCEDURE — 72141 MRI NECK SPINE W/O DYE: CPT | Mod: TC,PO

## 2020-10-16 NOTE — TELEPHONE ENCOUNTER
----- Message from Eboni Merida MA sent at 10/16/2020 11:03 AM CDT -----  Regarding: FW: Jessica/ Spouse/ [phone]  Pt would like to know why he didn't have a mri of his spine.   ----- Message -----  From: Margaux Cummins  Sent: 10/16/2020   9:35 AM CDT  To: Nadir Rasmussen Staff  Subject: Jessica/ Spouse/ [phone]                      Type: Patient Call Back    Who called:  Spouse    What is the request in detail:  Patient would like to know if there's a reason the MRI wasn't done on the back too.    Thank you    Would the patient rather a call back or a response via [facility]?  Call back    Best call back number:  [phone]    Thank you

## 2020-10-19 ENCOUNTER — TELEPHONE (OUTPATIENT)
Dept: NEUROSURGERY | Facility: CLINIC | Age: 60
End: 2020-10-19

## 2020-10-19 DIAGNOSIS — R93.7 ABNORMAL MRI, CERVICAL SPINE: Primary | ICD-10-CM

## 2020-10-19 NOTE — TELEPHONE ENCOUNTER
----- Message from Valery Schmitz PA-C sent at 10/19/2020  3:59 PM CDT -----  Regarding: RE: Results  MRI looks good from a fracture standpoint. He can discontinue his collar and return to work.     Unrelated to the fracture, there appear to be some inflammation at the C5-6 level. Radiology recommended we get some labs to make sure it isn't an infectious process. It is likely just degenerative changes (arthritis) but I will place the lab orders to be on the safe side. He can get them wherever is convenient for him.       Valery   ----- Message -----  From: Eboni Merida MA  Sent: 10/19/2020   2:20 PM CDT  To: Valery Schmitz PA-C  Subject: FW: Results                                      Patient calling for results of mri.   ----- Message -----  From: Shani Ha  Sent: 10/19/2020   1:15 PM CDT  To: Nadir Rasmussen Staff  Subject: Results                                          Name of Who is Calling: Juvencio Viera    What is the request in detail:  Patient called requesting his recent MRI and X-Rays results.  Please further advise    Reply by [facility]: no      Call Back: Mobile: [phone]

## 2020-10-20 ENCOUNTER — LAB VISIT (OUTPATIENT)
Dept: LAB | Facility: HOSPITAL | Age: 60
End: 2020-10-20
Attending: PHYSICIAN ASSISTANT
Payer: COMMERCIAL

## 2020-10-20 DIAGNOSIS — R93.7 ABNORMAL MRI, CERVICAL SPINE: ICD-10-CM

## 2020-10-20 LAB
ANION GAP SERPL CALC-SCNC: 9 MMOL/L (ref 8–16)
BASOPHILS # BLD AUTO: 0.03 K/UL (ref 0–0.2)
BASOPHILS NFR BLD: 0.6 % (ref 0–1.9)
BUN SERPL-MCNC: 11 MG/DL (ref 8–23)
CALCIUM SERPL-MCNC: 9.6 MG/DL (ref 8.7–10.5)
CHLORIDE SERPL-SCNC: 101 MMOL/L (ref 95–110)
CO2 SERPL-SCNC: 29 MMOL/L (ref 23–29)
CREAT SERPL-MCNC: 0.9 MG/DL (ref 0.5–1.4)
CRP SERPL-MCNC: 4.5 MG/L (ref 0–8.2)
DIFFERENTIAL METHOD: ABNORMAL
EOSINOPHIL # BLD AUTO: 0.1 K/UL (ref 0–0.5)
EOSINOPHIL NFR BLD: 2 % (ref 0–8)
ERYTHROCYTE [DISTWIDTH] IN BLOOD BY AUTOMATED COUNT: 13.8 % (ref 11.5–14.5)
ERYTHROCYTE [SEDIMENTATION RATE] IN BLOOD BY WESTERGREN METHOD: 3 MM/HR (ref 0–10)
EST. GFR  (AFRICAN AMERICAN): >60 ML/MIN/1.73 M^2
EST. GFR  (NON AFRICAN AMERICAN): >60 ML/MIN/1.73 M^2
GLUCOSE SERPL-MCNC: 140 MG/DL (ref 70–110)
HCT VFR BLD AUTO: 40.8 % (ref 40–54)
HGB BLD-MCNC: 13.8 G/DL (ref 14–18)
IMM GRANULOCYTES # BLD AUTO: 0.03 K/UL (ref 0–0.04)
IMM GRANULOCYTES NFR BLD AUTO: 0.6 % (ref 0–0.5)
LYMPHOCYTES # BLD AUTO: 1.2 K/UL (ref 1–4.8)
LYMPHOCYTES NFR BLD: 21.9 % (ref 18–48)
MCH RBC QN AUTO: 29.1 PG (ref 27–31)
MCHC RBC AUTO-ENTMCNC: 33.8 G/DL (ref 32–36)
MCV RBC AUTO: 86 FL (ref 82–98)
MONOCYTES # BLD AUTO: 0.6 K/UL (ref 0.3–1)
MONOCYTES NFR BLD: 11.8 % (ref 4–15)
NEUTROPHILS # BLD AUTO: 3.4 K/UL (ref 1.8–7.7)
NEUTROPHILS NFR BLD: 63.1 % (ref 38–73)
NRBC BLD-RTO: 0 /100 WBC
PLATELET # BLD AUTO: 245 K/UL (ref 150–350)
PMV BLD AUTO: 11.3 FL (ref 9.2–12.9)
POTASSIUM SERPL-SCNC: 4.4 MMOL/L (ref 3.5–5.1)
RBC # BLD AUTO: 4.74 M/UL (ref 4.6–6.2)
SODIUM SERPL-SCNC: 139 MMOL/L (ref 136–145)
WBC # BLD AUTO: 5.44 K/UL (ref 3.9–12.7)

## 2020-10-20 PROCEDURE — 36415 COLL VENOUS BLD VENIPUNCTURE: CPT

## 2020-10-20 PROCEDURE — 85652 RBC SED RATE AUTOMATED: CPT

## 2020-10-20 PROCEDURE — 85025 COMPLETE CBC W/AUTO DIFF WBC: CPT

## 2020-10-20 PROCEDURE — 86140 C-REACTIVE PROTEIN: CPT

## 2020-10-20 PROCEDURE — 80048 BASIC METABOLIC PNL TOTAL CA: CPT

## 2020-11-13 ENCOUNTER — OFFICE VISIT (OUTPATIENT)
Dept: FAMILY MEDICINE | Facility: CLINIC | Age: 60
End: 2020-11-13
Payer: COMMERCIAL

## 2020-11-13 VITALS
BODY MASS INDEX: 33.44 KG/M2 | DIASTOLIC BLOOD PRESSURE: 78 MMHG | SYSTOLIC BLOOD PRESSURE: 136 MMHG | WEIGHT: 213.06 LBS | HEART RATE: 68 BPM | TEMPERATURE: 98 F | OXYGEN SATURATION: 97 % | HEIGHT: 67 IN

## 2020-11-13 DIAGNOSIS — E78.5 HYPERLIPIDEMIA DUE TO TYPE 2 DIABETES MELLITUS: ICD-10-CM

## 2020-11-13 DIAGNOSIS — Z00.00 ROUTINE MEDICAL EXAM: Primary | ICD-10-CM

## 2020-11-13 DIAGNOSIS — I10 HYPERTENSION, WELL CONTROLLED: Chronic | ICD-10-CM

## 2020-11-13 DIAGNOSIS — E66.9 OBESITY, CLASS I, BMI 30-34.9: Chronic | ICD-10-CM

## 2020-11-13 DIAGNOSIS — E11.69 HYPERLIPIDEMIA DUE TO TYPE 2 DIABETES MELLITUS: ICD-10-CM

## 2020-11-13 DIAGNOSIS — E11.9 TYPE 2 DIABETES MELLITUS WITHOUT COMPLICATION, WITHOUT LONG-TERM CURRENT USE OF INSULIN: ICD-10-CM

## 2020-11-13 DIAGNOSIS — Z72.0 TOBACCO USE: Chronic | ICD-10-CM

## 2020-11-13 DIAGNOSIS — Z11.4 SCREENING FOR HIV (HUMAN IMMUNODEFICIENCY VIRUS): ICD-10-CM

## 2020-11-13 DIAGNOSIS — Z12.5 SCREENING FOR PROSTATE CANCER: ICD-10-CM

## 2020-11-13 PROCEDURE — 1126F PR PAIN SEVERITY QUANTIFIED, NO PAIN PRESENT: ICD-10-PCS | Mod: S$GLB,,, | Performed by: INTERNAL MEDICINE

## 2020-11-13 PROCEDURE — 3075F PR MOST RECENT SYSTOLIC BLOOD PRESS GE 130-139MM HG: ICD-10-PCS | Mod: CPTII,S$GLB,, | Performed by: INTERNAL MEDICINE

## 2020-11-13 PROCEDURE — 2024F 7 FLD RTA PHOTO EVC RTNOPTHY: CPT | Mod: S$GLB,,, | Performed by: INTERNAL MEDICINE

## 2020-11-13 PROCEDURE — 99396 PR PREVENTIVE VISIT,EST,40-64: ICD-10-PCS | Mod: S$GLB,,, | Performed by: INTERNAL MEDICINE

## 2020-11-13 PROCEDURE — 99999 PR PBB SHADOW E&M-EST. PATIENT-LVL IV: CPT | Mod: PBBFAC,,, | Performed by: INTERNAL MEDICINE

## 2020-11-13 PROCEDURE — 3075F SYST BP GE 130 - 139MM HG: CPT | Mod: CPTII,S$GLB,, | Performed by: INTERNAL MEDICINE

## 2020-11-13 PROCEDURE — 99999 PR PBB SHADOW E&M-EST. PATIENT-LVL IV: ICD-10-PCS | Mod: PBBFAC,,, | Performed by: INTERNAL MEDICINE

## 2020-11-13 PROCEDURE — 3051F HG A1C>EQUAL 7.0%<8.0%: CPT | Mod: CPTII,S$GLB,, | Performed by: INTERNAL MEDICINE

## 2020-11-13 PROCEDURE — 3051F PR MOST RECENT HEMOGLOBIN A1C LEVEL 7.0 - < 8.0%: ICD-10-PCS | Mod: CPTII,S$GLB,, | Performed by: INTERNAL MEDICINE

## 2020-11-13 PROCEDURE — 2024F PR 7 FIELD PHOTOS WITH INTERP/ REVIEW: ICD-10-PCS | Mod: S$GLB,,, | Performed by: INTERNAL MEDICINE

## 2020-11-13 PROCEDURE — 99396 PREV VISIT EST AGE 40-64: CPT | Mod: S$GLB,,, | Performed by: INTERNAL MEDICINE

## 2020-11-13 PROCEDURE — 1126F AMNT PAIN NOTED NONE PRSNT: CPT | Mod: S$GLB,,, | Performed by: INTERNAL MEDICINE

## 2020-11-13 PROCEDURE — 3008F BODY MASS INDEX DOCD: CPT | Mod: CPTII,S$GLB,, | Performed by: INTERNAL MEDICINE

## 2020-11-13 PROCEDURE — 3078F DIAST BP <80 MM HG: CPT | Mod: CPTII,S$GLB,, | Performed by: INTERNAL MEDICINE

## 2020-11-13 PROCEDURE — 3078F PR MOST RECENT DIASTOLIC BLOOD PRESSURE < 80 MM HG: ICD-10-PCS | Mod: CPTII,S$GLB,, | Performed by: INTERNAL MEDICINE

## 2020-11-13 PROCEDURE — 3008F PR BODY MASS INDEX (BMI) DOCUMENTED: ICD-10-PCS | Mod: CPTII,S$GLB,, | Performed by: INTERNAL MEDICINE

## 2020-11-13 RX ORDER — LISINOPRIL 20 MG/1
20 TABLET ORAL DAILY
Qty: 90 TABLET | Refills: 0 | Status: SHIPPED | OUTPATIENT
Start: 2020-11-13 | End: 2021-05-19

## 2020-11-13 RX ORDER — ATORVASTATIN CALCIUM 20 MG/1
20 TABLET, FILM COATED ORAL DAILY
Qty: 90 TABLET | Refills: 0 | Status: SHIPPED | OUTPATIENT
Start: 2020-11-13 | End: 2021-05-19

## 2020-11-13 RX ORDER — LANCETS 33 GAUGE
1 EACH MISCELLANEOUS 2 TIMES DAILY
Qty: 200 EACH | Refills: 11 | Status: SHIPPED | OUTPATIENT
Start: 2020-11-13

## 2020-11-13 RX ORDER — METFORMIN HYDROCHLORIDE 500 MG/1
1000 TABLET, EXTENDED RELEASE ORAL DAILY
Qty: 180 TABLET | Refills: 0 | Status: SHIPPED | OUTPATIENT
Start: 2020-11-13 | End: 2021-05-19
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
## 2020-11-15 NOTE — ASSESSMENT & PLAN NOTE
The current medical regimen is effective;  continue present plan and medications. The patient is asked to make an attempt to improve diet and exercise patterns to aid in medical management of this problem. Discussed adherence to daily dosing as well.

## 2020-11-15 NOTE — ASSESSMENT & PLAN NOTE
Counseled on need for daily dosing.  Recehck labs in 3 months.  The patient is asked to make an attempt to improve diet and exercise patterns to aid in medical management of this problem.  Will adjust meds as needed.

## 2020-11-21 ENCOUNTER — LAB VISIT (OUTPATIENT)
Dept: LAB | Facility: HOSPITAL | Age: 60
End: 2020-11-21
Attending: INTERNAL MEDICINE

## 2020-11-21 DIAGNOSIS — I10 HYPERTENSION, WELL CONTROLLED: Chronic | ICD-10-CM

## 2020-11-21 DIAGNOSIS — Z11.4 SCREENING FOR HIV (HUMAN IMMUNODEFICIENCY VIRUS): ICD-10-CM

## 2020-11-21 DIAGNOSIS — Z12.5 SCREENING FOR PROSTATE CANCER: ICD-10-CM

## 2020-11-21 DIAGNOSIS — E11.9 TYPE 2 DIABETES MELLITUS WITHOUT COMPLICATION, WITHOUT LONG-TERM CURRENT USE OF INSULIN: ICD-10-CM

## 2020-11-21 LAB
ALBUMIN SERPL BCP-MCNC: 4.1 G/DL (ref 3.5–5.2)
ALP SERPL-CCNC: 63 U/L (ref 55–135)
ALT SERPL W/O P-5'-P-CCNC: 24 U/L (ref 10–44)
ANION GAP SERPL CALC-SCNC: 8 MMOL/L (ref 8–16)
AST SERPL-CCNC: 28 U/L (ref 10–40)
BASOPHILS # BLD AUTO: 0.01 K/UL (ref 0–0.2)
BASOPHILS NFR BLD: 0.3 % (ref 0–1.9)
BILIRUB SERPL-MCNC: 0.9 MG/DL (ref 0.1–1)
BUN SERPL-MCNC: 10 MG/DL (ref 8–23)
CALCIUM SERPL-MCNC: 9.1 MG/DL (ref 8.7–10.5)
CHLORIDE SERPL-SCNC: 103 MMOL/L (ref 95–110)
CHOLEST SERPL-MCNC: 94 MG/DL (ref 120–199)
CHOLEST/HDLC SERPL: 2.7 {RATIO} (ref 2–5)
CO2 SERPL-SCNC: 31 MMOL/L (ref 23–29)
COMPLEXED PSA SERPL-MCNC: 0.43 NG/ML (ref 0–4)
CREAT SERPL-MCNC: 1 MG/DL (ref 0.5–1.4)
DIFFERENTIAL METHOD: ABNORMAL
EOSINOPHIL # BLD AUTO: 0.1 K/UL (ref 0–0.5)
EOSINOPHIL NFR BLD: 3 % (ref 0–8)
ERYTHROCYTE [DISTWIDTH] IN BLOOD BY AUTOMATED COUNT: 13.8 % (ref 11.5–14.5)
EST. GFR  (AFRICAN AMERICAN): >60 ML/MIN/1.73 M^2
EST. GFR  (NON AFRICAN AMERICAN): >60 ML/MIN/1.73 M^2
ESTIMATED AVG GLUCOSE: 143 MG/DL (ref 68–131)
GLUCOSE SERPL-MCNC: 128 MG/DL (ref 70–110)
HBA1C MFR BLD HPLC: 6.6 % (ref 4–5.6)
HCT VFR BLD AUTO: 42.1 % (ref 40–54)
HDLC SERPL-MCNC: 35 MG/DL (ref 40–75)
HDLC SERPL: 37.2 % (ref 20–50)
HGB BLD-MCNC: 13.1 G/DL (ref 14–18)
IMM GRANULOCYTES # BLD AUTO: 0.01 K/UL (ref 0–0.04)
IMM GRANULOCYTES NFR BLD AUTO: 0.3 % (ref 0–0.5)
LDLC SERPL CALC-MCNC: 46.4 MG/DL (ref 63–159)
LYMPHOCYTES # BLD AUTO: 1.2 K/UL (ref 1–4.8)
LYMPHOCYTES NFR BLD: 29.4 % (ref 18–48)
MCH RBC QN AUTO: 28.7 PG (ref 27–31)
MCHC RBC AUTO-ENTMCNC: 31.1 G/DL (ref 32–36)
MCV RBC AUTO: 92 FL (ref 82–98)
MONOCYTES # BLD AUTO: 0.5 K/UL (ref 0.3–1)
MONOCYTES NFR BLD: 13.7 % (ref 4–15)
NEUTROPHILS # BLD AUTO: 2.1 K/UL (ref 1.8–7.7)
NEUTROPHILS NFR BLD: 53.3 % (ref 38–73)
NONHDLC SERPL-MCNC: 59 MG/DL
NRBC BLD-RTO: 0 /100 WBC
PLATELET # BLD AUTO: 222 K/UL (ref 150–350)
PMV BLD AUTO: 11.6 FL (ref 9.2–12.9)
POTASSIUM SERPL-SCNC: 4.1 MMOL/L (ref 3.5–5.1)
PROT SERPL-MCNC: 7.4 G/DL (ref 6–8.4)
RBC # BLD AUTO: 4.56 M/UL (ref 4.6–6.2)
SODIUM SERPL-SCNC: 142 MMOL/L (ref 136–145)
TRIGL SERPL-MCNC: 63 MG/DL (ref 30–150)
WBC # BLD AUTO: 3.95 K/UL (ref 3.9–12.7)

## 2020-11-21 PROCEDURE — 80061 LIPID PANEL: CPT

## 2020-11-21 PROCEDURE — 86703 HIV-1/HIV-2 1 RESULT ANTBDY: CPT

## 2020-11-21 PROCEDURE — 83036 HEMOGLOBIN GLYCOSYLATED A1C: CPT

## 2020-11-21 PROCEDURE — 84153 ASSAY OF PSA TOTAL: CPT

## 2020-11-21 PROCEDURE — 85025 COMPLETE CBC W/AUTO DIFF WBC: CPT

## 2020-11-21 PROCEDURE — 80053 COMPREHEN METABOLIC PANEL: CPT

## 2020-11-21 PROCEDURE — 36415 COLL VENOUS BLD VENIPUNCTURE: CPT | Mod: PO

## 2020-11-23 LAB — HIV 1+2 AB+HIV1 P24 AG SERPL QL IA: NEGATIVE

## 2020-11-23 NOTE — PROGRESS NOTES
Your lab results are looking good.  Taking your medications every day will further reduce your risk of heart attack and stroke by improving your labs even more. Please continue your current medications and doses.  Please feel free to contact me with any questions or concerns.    Sincerely,  Brice Manuel  [email or web address]

## 2021-04-16 ENCOUNTER — PATIENT MESSAGE (OUTPATIENT)
Dept: RESEARCH | Facility: HOSPITAL | Age: 61
End: 2021-04-16

## 2021-05-19 DIAGNOSIS — E11.9 TYPE 2 DIABETES MELLITUS WITHOUT COMPLICATION, WITHOUT LONG-TERM CURRENT USE OF INSULIN: ICD-10-CM

## 2021-05-19 DIAGNOSIS — E78.5 HYPERLIPIDEMIA DUE TO TYPE 2 DIABETES MELLITUS: ICD-10-CM

## 2021-05-19 DIAGNOSIS — E11.69 HYPERLIPIDEMIA DUE TO TYPE 2 DIABETES MELLITUS: ICD-10-CM

## 2021-05-19 DIAGNOSIS — I10 HYPERTENSION, WELL CONTROLLED: Chronic | ICD-10-CM

## 2021-05-19 RX ORDER — METFORMIN HYDROCHLORIDE 500 MG/1
TABLET, EXTENDED RELEASE ORAL
Qty: 180 TABLET | Refills: 0 | Status: SHIPPED | OUTPATIENT
Start: 2021-05-19 | End: 2021-10-01

## 2021-05-19 RX ORDER — LISINOPRIL 20 MG/1
TABLET ORAL
Qty: 90 TABLET | Refills: 0 | Status: SHIPPED | OUTPATIENT
Start: 2021-05-19 | End: 2021-10-01

## 2021-05-19 RX ORDER — ATORVASTATIN CALCIUM 20 MG/1
TABLET, FILM COATED ORAL
Qty: 90 TABLET | Refills: 0 | Status: SHIPPED | OUTPATIENT
Start: 2021-05-19 | End: 2021-10-01

## 2021-08-04 ENCOUNTER — PATIENT MESSAGE (OUTPATIENT)
Dept: ADMINISTRATIVE | Facility: HOSPITAL | Age: 61
End: 2021-08-04

## 2021-10-01 DIAGNOSIS — E78.5 HYPERLIPIDEMIA DUE TO TYPE 2 DIABETES MELLITUS: ICD-10-CM

## 2021-10-01 DIAGNOSIS — I10 HYPERTENSION, WELL CONTROLLED: Chronic | ICD-10-CM

## 2021-10-01 DIAGNOSIS — E11.9 TYPE 2 DIABETES MELLITUS WITHOUT COMPLICATION, WITHOUT LONG-TERM CURRENT USE OF INSULIN: ICD-10-CM

## 2021-10-01 DIAGNOSIS — E11.69 HYPERLIPIDEMIA DUE TO TYPE 2 DIABETES MELLITUS: ICD-10-CM

## 2021-10-01 RX ORDER — LISINOPRIL 20 MG/1
TABLET ORAL
Qty: 90 TABLET | Refills: 0 | Status: SHIPPED | OUTPATIENT
Start: 2021-10-01 | End: 2023-05-31 | Stop reason: SDUPTHER

## 2021-10-01 RX ORDER — METFORMIN HYDROCHLORIDE 500 MG/1
TABLET, EXTENDED RELEASE ORAL
Qty: 180 TABLET | Refills: 0 | Status: SHIPPED | OUTPATIENT
Start: 2021-10-01 | End: 2023-05-31 | Stop reason: SDUPTHER

## 2021-10-01 RX ORDER — ATORVASTATIN CALCIUM 20 MG/1
TABLET, FILM COATED ORAL
Qty: 90 TABLET | Refills: 0 | Status: SHIPPED | OUTPATIENT
Start: 2021-10-01 | End: 2024-04-01

## 2021-10-04 ENCOUNTER — PATIENT MESSAGE (OUTPATIENT)
Dept: ADMINISTRATIVE | Facility: HOSPITAL | Age: 61
End: 2021-10-04

## 2021-10-18 ENCOUNTER — PATIENT MESSAGE (OUTPATIENT)
Dept: ADMINISTRATIVE | Facility: HOSPITAL | Age: 61
End: 2021-10-18

## 2022-03-11 ENCOUNTER — PATIENT MESSAGE (OUTPATIENT)
Dept: FAMILY MEDICINE | Facility: CLINIC | Age: 62
End: 2022-03-11

## 2022-06-01 ENCOUNTER — PATIENT MESSAGE (OUTPATIENT)
Dept: ADMINISTRATIVE | Facility: HOSPITAL | Age: 62
End: 2022-06-01

## 2023-05-05 ENCOUNTER — TELEPHONE (OUTPATIENT)
Dept: FAMILY MEDICINE | Facility: CLINIC | Age: 63
End: 2023-05-05
Payer: MEDICARE

## 2023-05-05 NOTE — TELEPHONE ENCOUNTER
----- Message from Ghislaine Pina sent at 5/5/2023 10:54 AM CDT -----  Regarding: patient call back  Type: Patient Call Back    Who called: Magaly    What is the request in detail: Checking to see if the doctor received the  V[employer] form for his insurance. It needs to be completed within 60 days.     Can the clinic reply by [facility]? No     Would the patient rather a call back or a response via [facility]? Call     Best call back number: .[phone]

## 2023-05-17 ENCOUNTER — TELEPHONE (OUTPATIENT)
Dept: FAMILY MEDICINE | Facility: CLINIC | Age: 63
End: 2023-05-17
Payer: MEDICARE

## 2023-05-17 NOTE — TELEPHONE ENCOUNTER
----- Message from Tricia Osorio sent at 5/16/2023  2:15 PM CDT -----  Regarding: Patient Call Back  .Type: Patient Call Back    Who called: Wife -- Jessica    What is the request in detail: Called to check on the status of the paperwork that was faxed to this office by [facility]. Wife wants to know was the paperwork ever faxed back over to [facility]? They are stating if they dont receive it they would have to drop him from their program. Please advise.    Can the clinic reply by [facility]? No    Would the patient rather a call back or a response via [facility]? Call back    Best call back number: [phone]    Additional Information:

## 2023-05-26 ENCOUNTER — TELEPHONE (OUTPATIENT)
Dept: FAMILY MEDICINE | Facility: CLINIC | Age: 63
End: 2023-05-26
Payer: MEDICARE

## 2023-05-26 NOTE — TELEPHONE ENCOUNTER
----- Message from Jake Tony sent at 5/26/2023  4:14 PM CDT -----  Regarding: Wife [phone]  Type:  Sooner Appointment Request    Patient is requesting a sooner appointment.  Patient declined first available appointment listed as well as another facility and provider .  Patient will not accept being placed on the waitlist and is requesting a message be sent to doctor.    Name of Caller:  Wife     When is the first available appointment?  September 2023     Symptoms:  Annual/physical, med refill    Would the patient rather a call back or a response via [facility]?  Call back     Best Call Back Number:  [phone]     Additional Information:  pt stated she will give the insurance information to the nurse when she gets a call.

## 2023-05-31 ENCOUNTER — TELEPHONE (OUTPATIENT)
Dept: FAMILY MEDICINE | Facility: CLINIC | Age: 63
End: 2023-05-31
Payer: MEDICARE

## 2023-05-31 ENCOUNTER — OFFICE VISIT (OUTPATIENT)
Dept: FAMILY MEDICINE | Facility: CLINIC | Age: 63
End: 2023-05-31
Payer: MEDICARE

## 2023-05-31 VITALS
OXYGEN SATURATION: 98 % | TEMPERATURE: 99 F | DIASTOLIC BLOOD PRESSURE: 96 MMHG | WEIGHT: 205.38 LBS | BODY MASS INDEX: 32.24 KG/M2 | SYSTOLIC BLOOD PRESSURE: 158 MMHG | HEIGHT: 67 IN | HEART RATE: 71 BPM

## 2023-05-31 DIAGNOSIS — E11.9 TYPE 2 DIABETES MELLITUS WITHOUT COMPLICATION, WITHOUT LONG-TERM CURRENT USE OF INSULIN: ICD-10-CM

## 2023-05-31 DIAGNOSIS — I10 HYPERTENSION, WELL CONTROLLED: Chronic | ICD-10-CM

## 2023-05-31 DIAGNOSIS — Z12.5 ENCOUNTER FOR SCREENING FOR MALIGNANT NEOPLASM OF PROSTATE: ICD-10-CM

## 2023-05-31 DIAGNOSIS — Z00.00 ANNUAL PHYSICAL EXAM: Primary | ICD-10-CM

## 2023-05-31 DIAGNOSIS — R79.9 ABNORMAL FINDING OF BLOOD CHEMISTRY, UNSPECIFIED: ICD-10-CM

## 2023-05-31 PROCEDURE — 3077F PR MOST RECENT SYSTOLIC BLOOD PRESSURE >= 140 MM HG: ICD-10-PCS | Mod: CPTII,S$GLB,,

## 2023-05-31 PROCEDURE — 1159F PR MEDICATION LIST DOCUMENTED IN MEDICAL RECORD: ICD-10-PCS | Mod: CPTII,S$GLB,,

## 2023-05-31 PROCEDURE — 99214 OFFICE O/P EST MOD 30 MIN: CPT | Mod: S$GLB,,,

## 2023-05-31 PROCEDURE — 3008F PR BODY MASS INDEX (BMI) DOCUMENTED: ICD-10-PCS | Mod: CPTII,S$GLB,,

## 2023-05-31 PROCEDURE — 3288F FALL RISK ASSESSMENT DOCD: CPT | Mod: CPTII,S$GLB,,

## 2023-05-31 PROCEDURE — 3080F PR MOST RECENT DIASTOLIC BLOOD PRESSURE >= 90 MM HG: ICD-10-PCS | Mod: CPTII,S$GLB,,

## 2023-05-31 PROCEDURE — 99999 PR PBB SHADOW E&M-EST. PATIENT-LVL IV: ICD-10-PCS | Mod: PBBFAC,,,

## 2023-05-31 PROCEDURE — 3077F SYST BP >= 140 MM HG: CPT | Mod: CPTII,S$GLB,,

## 2023-05-31 PROCEDURE — 99999 PR PBB SHADOW E&M-EST. PATIENT-LVL IV: CPT | Mod: PBBFAC,,,

## 2023-05-31 PROCEDURE — 1101F PT FALLS ASSESS-DOCD LE1/YR: CPT | Mod: CPTII,S$GLB,,

## 2023-05-31 PROCEDURE — 1159F MED LIST DOCD IN RCRD: CPT | Mod: CPTII,S$GLB,,

## 2023-05-31 PROCEDURE — 1101F PR PT FALLS ASSESS DOC 0-1 FALLS W/OUT INJ PAST YR: ICD-10-PCS | Mod: CPTII,S$GLB,,

## 2023-05-31 PROCEDURE — 3080F DIAST BP >= 90 MM HG: CPT | Mod: CPTII,S$GLB,,

## 2023-05-31 PROCEDURE — 3288F PR FALLS RISK ASSESSMENT DOCUMENTED: ICD-10-PCS | Mod: CPTII,S$GLB,,

## 2023-05-31 PROCEDURE — 3008F BODY MASS INDEX DOCD: CPT | Mod: CPTII,S$GLB,,

## 2023-05-31 PROCEDURE — 99214 PR OFFICE/OUTPT VISIT, EST, LEVL IV, 30-39 MIN: ICD-10-PCS | Mod: S$GLB,,,

## 2023-05-31 RX ORDER — INSULIN PUMP SYRINGE, 3 ML
EACH MISCELLANEOUS
Qty: 1 EACH | Refills: 0 | Status: SHIPPED | OUTPATIENT
Start: 2023-05-31

## 2023-05-31 RX ORDER — LANCETS
EACH MISCELLANEOUS
Qty: 200 EACH | Refills: 11 | Status: SHIPPED | OUTPATIENT
Start: 2023-05-31

## 2023-05-31 RX ORDER — LISINOPRIL 20 MG/1
20 TABLET ORAL DAILY
Qty: 90 TABLET | Refills: 2 | Status: SHIPPED | OUTPATIENT
Start: 2023-05-31

## 2023-05-31 RX ORDER — METFORMIN HYDROCHLORIDE 500 MG/1
1000 TABLET, EXTENDED RELEASE ORAL DAILY
Qty: 180 TABLET | Refills: 2 | Status: SHIPPED | OUTPATIENT
Start: 2023-05-31

## 2023-05-31 NOTE — TELEPHONE ENCOUNTER
----- Message from Jake Tony sent at 5/26/2023  4:05 PM CDT -----  Regarding: [employer] [phone]  ref # [id number]  Type: Patient Call Back    Who called:  [employer]     What is the request in detail: called to check to see to see if the doctor had received a fax.     Can the clinic reply by [facility]? No     Would the patient rather a call back or a response via [facility]? Call back     Best call back number:   [phone]  ref # [id number]    Additional Information:    Thank you.

## 2023-05-31 NOTE — PATIENT INSTRUCTIONS
Medical Fitness--[phone]  Imaging, Xray, CT, MRI, Ultrasound---[phone]  Bariatrics---[phone]  Breast Surgery---[phone]  Case Management---[phone]  Colonoscopy---[phone]  DME---[phone]  Infectious Disease---[phone]  Interventional Radiology---[phone]  Medical Records---[phone]  [facility]---[phone]  Optometry/Ophthalmology---[phone]  [facility]---[phone]  Physical Therapy---[phone]  Psychiatry---[phone] or [phone]  Plastic Surgery---[phone]  Recovery--[phone] option 2, or [phone].  Sleep Study---[phone]  Smoking Cessation---[phone]  Wound Care---[phone]  Referral Desk---[phone]

## 2023-05-31 NOTE — TELEPHONE ENCOUNTER
Called [employer], automated system. No person to inquire about the call back. Papers have been received. Provider out until Monday 6/5/23. Provider will address [employer] fax chronic condition forms for patient when he returns.

## 2023-05-31 NOTE — PROGRESS NOTES
HPI     Chief Complaint:  Chief Complaint   Patient presents with    Annual Exam    Medication Refill       Juvencio Viera is a 63 y.o. male with multiple medical diagnoses as listed in the medical history and problem list that presents for annual exam.  Pt is not known to me with his last appointment Visit date not found.      HPI  Pt presents for annual exam. Doing well, no complaints. Pt without BP medicine for about 3 months.        Assessment & Plan     Problem List Items Addressed This Visit          Cardiac/Vascular    Hypertension, well controlled (Chronic)    Overview     Without BP medication for 3 months. BP in clinic 150/90 with recheck 158/96. Will refill Lisinopril. Recheck BP in 2 weeks.       Relevant Medications    lisinopriL (PRINIVIL,ZESTRIL) 20 MG tablet       Endocrine    Type 2 diabetes mellitus without complication, without long-term current use of insulin (Chronic)    Relevant Medications  Will refill Metformin and order new diabetic supplies for blood sugar monitoring.    metFORMIN (GLUCOPHAGE-XR) 500 MG ER 24hr tablet    blood-glucose meter kit    lancets Misc    blood sugar diagnostic Strp     Other Visit Diagnoses       Annual physical exam    -  Primary  Routine fasting labs ordered. Discussed healthy diet and exercise.    Relevant Orders    PSA, SCREENING    HEMOGLOBIN A1C    LIPID PANEL    COMPREHENSIVE METABOLIC PANEL    CBC Auto Differential    Encounter for screening for malignant neoplasm of prostate      Will order routine PSA.    Relevant Orders    PSA, SCREENING    Abnormal finding of blood chemistry, unspecified      Will order routine fasting labs.    Relevant Orders    HEMOGLOBIN A1C    LIPID PANEL    CBC Auto Differential            --------------------------------------------      Health Maintenance:  Health Maintenance         Date Due Completion Date    COVID-19 Vaccine (1) Never done ---    Pneumococcal Vaccines (Age 65+) (1 - PCV) Never done ---    TETANUS  "VACCINE Never done ---    Shingles Vaccine (1 of 2) Never done ---    PROSTATE-SPECIFIC ANTIGEN 11/21/2021 11/21/2020    Abdominal Aortic Aneurysm Screening Never done ---    Influenza Vaccine (Season Ended) 09/01/2023 ---    Colorectal Cancer Screening 05/04/2025 5/4/2015    Lipid Panel 11/21/2025 11/21/2020            Discussed the importance of overdue vaccines which were offered during this encounter. Patient declined overdue vaccines at this time and Health maintenance reviewed    Follow Up:  Follow up in about 2 weeks (around 6/14/2023).    Exam     Review of Systems:  (as noted above)  Review of Systems   Constitutional:  Negative for fever.   HENT:  Negative for trouble swallowing.    Eyes:  Negative for visual disturbance.   Respiratory:  Negative for chest tightness and shortness of breath.    Cardiovascular:  Negative for chest pain.   Gastrointestinal:  Negative for blood in stool.     Physical Exam:   Physical Exam  Constitutional:       General: He is not in acute distress.     Appearance: He is obese. He is not ill-appearing or diaphoretic.   HENT:      Head: Normocephalic and atraumatic.   Eyes:      General: No scleral icterus.  Cardiovascular:      Rate and Rhythm: Normal rate and regular rhythm.      Pulses: Normal pulses.      Heart sounds: No murmur heard.    No friction rub. No gallop.   Pulmonary:      Effort: No respiratory distress.   Chest:      Chest wall: No tenderness.   Musculoskeletal:      Cervical back: No rigidity.   Neurological:      Mental Status: He is alert.     Vitals:    05/31/23 1614 05/31/23 1640   BP: (!) 150/90 (!) 158/96   BP Location: Right arm    Patient Position: Sitting    BP Method: Large (Manual)    Pulse: 71    Temp: 99 °F (37.2 °C)    TempSrc: Oral    SpO2: 98%    Weight: 93.2 kg (205 lb 5.7 oz)    Height: 5' 7" (1.702 m)       Body mass index is 32.16 kg/m².        History     Past Medical History:  Past Medical History:   Diagnosis Date    Hypertension     " Hypertension, well controlled 2/27/2015    Type 2 diabetes mellitus without complication, without long-term current use of insulin 9/5/2018       Past Surgical History:  Past Surgical History:   Procedure Laterality Date    NO PAST SURGERIES         Social History:  Social History     Socioeconomic History    Marital status:    Tobacco Use    Smoking status: Some Days     Packs/day: 1.00     Types: Cigars, Cigarettes    Smokeless tobacco: Never   Substance and Sexual Activity    Alcohol use: Yes     Alcohol/week: 4.0 standard drinks     Types: 4 Cans of beer per week     Comment: daily    Drug use: No    Sexual activity: Yes     Partners: Female       Family History:  Family History   Problem Relation Age of Onset    Hypertension Mother     Diabetes Mother        Allergies and Medications: (updated and reviewed)  Review of patient's allergies indicates:  No Known Allergies  Current Outpatient Medications   Medication Sig Dispense Refill    lancets 33 gauge Misc 1 lancet by Misc.(Non-Drug; Combo Route) route 2 (two) times daily. Lancets for a contour meter. Test twice a day 200 each 11    atorvastatin (LIPITOR) 20 MG tablet Take 1 tablet by mouth once daily (Patient not taking: Reported on 5/31/2023) 90 tablet 0    blood sugar diagnostic Strp To check BG 2 times daily, to use with insurance preferred meter 200 each 11    blood-glucose meter kit To check BG 2 times daily, to use with insurance preferred meter 1 each 0    blood-glucose meter Misc Whatever meter insurance covers (Patient not taking: Reported on 11/13/2020) 1 each 0    CONTOUR NEXT TEST STRIPS Strp USE  STRIP TO CHECK GLUCOSE TWICE DAILY (Patient not taking: Reported on 11/13/2020) 100 strip 11    ketoconazole (NIZORAL) 2 % cream Apply topically once daily. (Patient not taking: Reported on 11/13/2020) 30 g 0    lancets Misc To check BG 2 times daily, to use with insurance preferred meter 200 each 11    lisinopriL (PRINIVIL,ZESTRIL) 20 MG tablet  Take 1 tablet (20 mg total) by mouth once daily. 90 tablet 2    metFORMIN (GLUCOPHAGE-XR) 500 MG ER 24hr tablet Take 2 tablets (1,000 mg total) by mouth once daily. 180 tablet 2     No current facility-administered medications for this visit.       Patient Care Team:  Brice Manuel MD as PCP - General (Internal Medicine)  Angie Alatorre LPN as Licensed Practical Nurse         - The patient is given an After Visit Summary that lists all medications with directions, allergies, education, orders placed during this encounter and follow-up instructions.      - I have reviewed the patient's medical information including past medical, family, and social history sections including the medications and allergies.      - We discussed the patient's current medications.     This note was created by combination of typed  and MModal dictation.  Transcription errors may be present.  If there are any questions, please contact me.        Martine Gibson NP

## 2023-06-02 ENCOUNTER — TELEPHONE (OUTPATIENT)
Dept: FAMILY MEDICINE | Facility: CLINIC | Age: 63
End: 2023-06-02
Payer: MEDICARE

## 2023-06-02 NOTE — TELEPHONE ENCOUNTER
----- Message from Lisa Mendieta sent at 6/2/2023  2:59 PM CDT -----  Type: Patient Call Back    Who called:katarzyna with [employer] [phone] ref no [id number]    What is the request in detail:requesting to confirm chronic condition for diabetes. Call katarzyna    Can the clinic reply by [facility]?    Would the patient rather a call back or a response via [facility]? call    Best call back number:.      Additional Information:

## 2023-06-02 NOTE — TELEPHONE ENCOUNTER
Spoke with raiza at [facility] to give fax number to send over requested information

## 2023-06-05 ENCOUNTER — LAB VISIT (OUTPATIENT)
Dept: LAB | Facility: HOSPITAL | Age: 63
End: 2023-06-05
Payer: MEDICARE

## 2023-06-05 DIAGNOSIS — Z12.5 ENCOUNTER FOR SCREENING FOR MALIGNANT NEOPLASM OF PROSTATE: ICD-10-CM

## 2023-06-05 DIAGNOSIS — R79.9 ABNORMAL FINDING OF BLOOD CHEMISTRY, UNSPECIFIED: ICD-10-CM

## 2023-06-05 DIAGNOSIS — Z00.00 ANNUAL PHYSICAL EXAM: ICD-10-CM

## 2023-06-05 LAB
ALBUMIN SERPL BCP-MCNC: 3.9 G/DL (ref 3.5–5.2)
ALP SERPL-CCNC: 60 U/L (ref 55–135)
ALT SERPL W/O P-5'-P-CCNC: 17 U/L (ref 10–44)
ANION GAP SERPL CALC-SCNC: 8 MMOL/L (ref 8–16)
AST SERPL-CCNC: 13 U/L (ref 10–40)
BASOPHILS # BLD AUTO: 0.01 K/UL (ref 0–0.2)
BASOPHILS NFR BLD: 0.2 % (ref 0–1.9)
BILIRUB SERPL-MCNC: 1.1 MG/DL (ref 0.1–1)
BUN SERPL-MCNC: 14 MG/DL (ref 8–23)
CALCIUM SERPL-MCNC: 9.3 MG/DL (ref 8.7–10.5)
CHLORIDE SERPL-SCNC: 103 MMOL/L (ref 95–110)
CHOLEST SERPL-MCNC: 121 MG/DL (ref 120–199)
CHOLEST/HDLC SERPL: 3.9 {RATIO} (ref 2–5)
CO2 SERPL-SCNC: 29 MMOL/L (ref 23–29)
COMPLEXED PSA SERPL-MCNC: 0.57 NG/ML (ref 0–4)
CREAT SERPL-MCNC: 0.9 MG/DL (ref 0.5–1.4)
DIFFERENTIAL METHOD: ABNORMAL
EOSINOPHIL # BLD AUTO: 0.1 K/UL (ref 0–0.5)
EOSINOPHIL NFR BLD: 3.1 % (ref 0–8)
ERYTHROCYTE [DISTWIDTH] IN BLOOD BY AUTOMATED COUNT: 13.2 % (ref 11.5–14.5)
EST. GFR  (NO RACE VARIABLE): >60 ML/MIN/1.73 M^2
ESTIMATED AVG GLUCOSE: 154 MG/DL (ref 68–131)
GLUCOSE SERPL-MCNC: 114 MG/DL (ref 70–110)
HBA1C MFR BLD: 7 % (ref 4–5.6)
HCT VFR BLD AUTO: 39.9 % (ref 40–54)
HDLC SERPL-MCNC: 31 MG/DL (ref 40–75)
HDLC SERPL: 25.6 % (ref 20–50)
HGB BLD-MCNC: 12.7 G/DL (ref 14–18)
IMM GRANULOCYTES # BLD AUTO: 0.01 K/UL (ref 0–0.04)
IMM GRANULOCYTES NFR BLD AUTO: 0.2 % (ref 0–0.5)
LDLC SERPL CALC-MCNC: 73.4 MG/DL (ref 63–159)
LYMPHOCYTES # BLD AUTO: 1.6 K/UL (ref 1–4.8)
LYMPHOCYTES NFR BLD: 34.4 % (ref 18–48)
MCH RBC QN AUTO: 28.2 PG (ref 27–31)
MCHC RBC AUTO-ENTMCNC: 31.8 G/DL (ref 32–36)
MCV RBC AUTO: 89 FL (ref 82–98)
MONOCYTES # BLD AUTO: 0.6 K/UL (ref 0.3–1)
MONOCYTES NFR BLD: 13.2 % (ref 4–15)
NEUTROPHILS # BLD AUTO: 2.2 K/UL (ref 1.8–7.7)
NEUTROPHILS NFR BLD: 48.9 % (ref 38–73)
NONHDLC SERPL-MCNC: 90 MG/DL
NRBC BLD-RTO: 0 /100 WBC
PLATELET # BLD AUTO: 232 K/UL (ref 150–450)
PMV BLD AUTO: 11.7 FL (ref 9.2–12.9)
POTASSIUM SERPL-SCNC: 3.8 MMOL/L (ref 3.5–5.1)
PROT SERPL-MCNC: 7 G/DL (ref 6–8.4)
RBC # BLD AUTO: 4.5 M/UL (ref 4.6–6.2)
SODIUM SERPL-SCNC: 140 MMOL/L (ref 136–145)
TRIGL SERPL-MCNC: 83 MG/DL (ref 30–150)
WBC # BLD AUTO: 4.53 K/UL (ref 3.9–12.7)

## 2023-06-05 PROCEDURE — 83036 HEMOGLOBIN GLYCOSYLATED A1C: CPT

## 2023-06-05 PROCEDURE — 80061 LIPID PANEL: CPT

## 2023-06-05 PROCEDURE — 85025 COMPLETE CBC W/AUTO DIFF WBC: CPT

## 2023-06-05 PROCEDURE — 84153 ASSAY OF PSA TOTAL: CPT

## 2023-06-05 PROCEDURE — 80053 COMPREHEN METABOLIC PANEL: CPT

## 2023-06-05 PROCEDURE — 36415 COLL VENOUS BLD VENIPUNCTURE: CPT | Mod: PO

## 2023-06-06 ENCOUNTER — TELEPHONE (OUTPATIENT)
Dept: FAMILY MEDICINE | Facility: CLINIC | Age: 63
End: 2023-06-06
Payer: MEDICARE

## 2023-06-06 NOTE — TELEPHONE ENCOUNTER
----- Message from Zina Adams sent at 6/6/2023  1:24 PM CDT -----  Regarding: self [phone]  Who called: self       What is the request in detail: regarding V[employer] form he need filled out for [employer], pt stated the paper was faxed over last month, pt stated paper was given NP on 05/31, pt need paperwork filled before pt loses ins coverage before end of the month and would fax back to [employer] [phone]       Can the clinic reply by [facility]? No        Would the patient rather a call back or a response via [facility]? Call back       Best call back number:[phone]

## 2023-06-16 ENCOUNTER — PATIENT OUTREACH (OUTPATIENT)
Dept: ADMINISTRATIVE | Facility: HOSPITAL | Age: 63
End: 2023-06-16
Payer: MEDICARE

## 2023-06-16 RX ORDER — GLUCOSAM/CHON-MSM1/C/MANG/BOSW 500-416.6
TABLET ORAL
COMMUNITY
Start: 2023-05-31

## 2023-06-16 NOTE — PROGRESS NOTES
[facility] 1 Panel Report 05.25.23 - per the patient's wife, he is still a patient of Dr. Manuel. She is unable to schedule at this time due to the loss of her mother. She will call back to schedule his Nurse visit once everything is over with the .

## 2023-06-26 ENCOUNTER — PATIENT MESSAGE (OUTPATIENT)
Dept: FAMILY MEDICINE | Facility: CLINIC | Age: 63
End: 2023-06-26
Payer: MEDICARE

## 2023-06-29 ENCOUNTER — PATIENT MESSAGE (OUTPATIENT)
Dept: ADMINISTRATIVE | Facility: HOSPITAL | Age: 63
End: 2023-06-29
Payer: MEDICARE

## 2023-06-29 ENCOUNTER — PATIENT OUTREACH (OUTPATIENT)
Dept: ADMINISTRATIVE | Facility: HOSPITAL | Age: 63
End: 2023-06-29
Payer: MEDICARE

## 2023-06-29 NOTE — PROGRESS NOTES
[facility] 1 BP Gap Report 05.01.23 - spoke w/ the patient's wife, she reports that the patient does have a blood pressure cuff at home, she will call back with a reading.

## 2024-01-11 DIAGNOSIS — Z00.00 ENCOUNTER FOR MEDICARE ANNUAL WELLNESS EXAM: ICD-10-CM

## 2024-04-01 ENCOUNTER — LAB VISIT (OUTPATIENT)
Dept: LAB | Facility: HOSPITAL | Age: 64
End: 2024-04-01
Attending: INTERNAL MEDICINE
Payer: MEDICARE

## 2024-04-01 ENCOUNTER — OFFICE VISIT (OUTPATIENT)
Dept: INTERNAL MEDICINE | Facility: CLINIC | Age: 64
End: 2024-04-01
Payer: MEDICARE

## 2024-04-01 ENCOUNTER — PATIENT MESSAGE (OUTPATIENT)
Dept: INTERNAL MEDICINE | Facility: CLINIC | Age: 64
End: 2024-04-01

## 2024-04-01 VITALS
BODY MASS INDEX: 31.59 KG/M2 | DIASTOLIC BLOOD PRESSURE: 74 MMHG | HEART RATE: 66 BPM | OXYGEN SATURATION: 98 % | HEIGHT: 67 IN | SYSTOLIC BLOOD PRESSURE: 126 MMHG | WEIGHT: 201.25 LBS

## 2024-04-01 DIAGNOSIS — I10 HYPERTENSION, WELL CONTROLLED: Chronic | ICD-10-CM

## 2024-04-01 DIAGNOSIS — R05.8 POST-VIRAL COUGH SYNDROME: Primary | ICD-10-CM

## 2024-04-01 DIAGNOSIS — Z72.0 TOBACCO USE: Chronic | ICD-10-CM

## 2024-04-01 DIAGNOSIS — E78.5 HYPERLIPIDEMIA DUE TO TYPE 2 DIABETES MELLITUS: Chronic | ICD-10-CM

## 2024-04-01 DIAGNOSIS — E11.9 TYPE 2 DIABETES MELLITUS WITHOUT COMPLICATION, WITHOUT LONG-TERM CURRENT USE OF INSULIN: Chronic | ICD-10-CM

## 2024-04-01 DIAGNOSIS — E11.69 HYPERLIPIDEMIA DUE TO TYPE 2 DIABETES MELLITUS: ICD-10-CM

## 2024-04-01 DIAGNOSIS — E78.5 HYPERLIPIDEMIA DUE TO TYPE 2 DIABETES MELLITUS: ICD-10-CM

## 2024-04-01 DIAGNOSIS — E11.69 HYPERLIPIDEMIA DUE TO TYPE 2 DIABETES MELLITUS: Chronic | ICD-10-CM

## 2024-04-01 DIAGNOSIS — I70.0 ATHEROSCLEROSIS OF ABDOMINAL AORTA: Chronic | ICD-10-CM

## 2024-04-01 LAB
ALBUMIN SERPL BCP-MCNC: 4 G/DL (ref 3.5–5.2)
ALP SERPL-CCNC: 72 U/L (ref 55–135)
ALT SERPL W/O P-5'-P-CCNC: 40 U/L (ref 10–44)
ANION GAP SERPL CALC-SCNC: 7 MMOL/L (ref 8–16)
AST SERPL-CCNC: 14 U/L (ref 10–40)
BASOPHILS # BLD AUTO: 0.02 K/UL (ref 0–0.2)
BASOPHILS NFR BLD: 0.4 % (ref 0–1.9)
BILIRUB SERPL-MCNC: 1.3 MG/DL (ref 0.1–1)
BUN SERPL-MCNC: 11 MG/DL (ref 8–23)
CALCIUM SERPL-MCNC: 9.5 MG/DL (ref 8.7–10.5)
CHLORIDE SERPL-SCNC: 104 MMOL/L (ref 95–110)
CHOLEST SERPL-MCNC: 162 MG/DL (ref 120–199)
CHOLEST/HDLC SERPL: 5.1 {RATIO} (ref 2–5)
CO2 SERPL-SCNC: 28 MMOL/L (ref 23–29)
CREAT SERPL-MCNC: 1 MG/DL (ref 0.5–1.4)
DIFFERENTIAL METHOD BLD: ABNORMAL
EOSINOPHIL # BLD AUTO: 0.1 K/UL (ref 0–0.5)
EOSINOPHIL NFR BLD: 2.3 % (ref 0–8)
ERYTHROCYTE [DISTWIDTH] IN BLOOD BY AUTOMATED COUNT: 14.2 % (ref 11.5–14.5)
EST. GFR  (NO RACE VARIABLE): >60 ML/MIN/1.73 M^2
ESTIMATED AVG GLUCOSE: 137 MG/DL (ref 68–131)
GLUCOSE SERPL-MCNC: 129 MG/DL (ref 70–110)
HBA1C MFR BLD: 6.4 % (ref 4–5.6)
HCT VFR BLD AUTO: 40.2 % (ref 40–54)
HDLC SERPL-MCNC: 32 MG/DL (ref 40–75)
HDLC SERPL: 19.8 % (ref 20–50)
HGB BLD-MCNC: 13.1 G/DL (ref 14–18)
IMM GRANULOCYTES # BLD AUTO: 0.03 K/UL (ref 0–0.04)
IMM GRANULOCYTES NFR BLD AUTO: 0.6 % (ref 0–0.5)
LDLC SERPL CALC-MCNC: 113.2 MG/DL (ref 63–159)
LYMPHOCYTES # BLD AUTO: 1.1 K/UL (ref 1–4.8)
LYMPHOCYTES NFR BLD: 20.2 % (ref 18–48)
MCH RBC QN AUTO: 29.1 PG (ref 27–31)
MCHC RBC AUTO-ENTMCNC: 32.6 G/DL (ref 32–36)
MCV RBC AUTO: 89 FL (ref 82–98)
MONOCYTES # BLD AUTO: 0.7 K/UL (ref 0.3–1)
MONOCYTES NFR BLD: 13.5 % (ref 4–15)
NEUTROPHILS # BLD AUTO: 3.3 K/UL (ref 1.8–7.7)
NEUTROPHILS NFR BLD: 63 % (ref 38–73)
NONHDLC SERPL-MCNC: 130 MG/DL
NRBC BLD-RTO: 0 /100 WBC
PLATELET # BLD AUTO: 239 K/UL (ref 150–450)
PMV BLD AUTO: 11.5 FL (ref 9.2–12.9)
POTASSIUM SERPL-SCNC: 3.8 MMOL/L (ref 3.5–5.1)
PROT SERPL-MCNC: 7.3 G/DL (ref 6–8.4)
RBC # BLD AUTO: 4.5 M/UL (ref 4.6–6.2)
SODIUM SERPL-SCNC: 139 MMOL/L (ref 136–145)
TRIGL SERPL-MCNC: 84 MG/DL (ref 30–150)
WBC # BLD AUTO: 5.2 K/UL (ref 3.9–12.7)

## 2024-04-01 PROCEDURE — 99999 PR PBB SHADOW E&M-EST. PATIENT-LVL IV: CPT | Mod: PBBFAC,,, | Performed by: INTERNAL MEDICINE

## 2024-04-01 PROCEDURE — 1160F RVW MEDS BY RX/DR IN RCRD: CPT | Mod: CPTII,S$GLB,, | Performed by: INTERNAL MEDICINE

## 2024-04-01 PROCEDURE — 99214 OFFICE O/P EST MOD 30 MIN: CPT | Mod: S$GLB,,, | Performed by: INTERNAL MEDICINE

## 2024-04-01 PROCEDURE — 80053 COMPREHEN METABOLIC PANEL: CPT | Performed by: INTERNAL MEDICINE

## 2024-04-01 PROCEDURE — 1101F PT FALLS ASSESS-DOCD LE1/YR: CPT | Mod: CPTII,S$GLB,, | Performed by: INTERNAL MEDICINE

## 2024-04-01 PROCEDURE — 1126F AMNT PAIN NOTED NONE PRSNT: CPT | Mod: CPTII,S$GLB,, | Performed by: INTERNAL MEDICINE

## 2024-04-01 PROCEDURE — 3008F BODY MASS INDEX DOCD: CPT | Mod: CPTII,S$GLB,, | Performed by: INTERNAL MEDICINE

## 2024-04-01 PROCEDURE — 3078F DIAST BP <80 MM HG: CPT | Mod: CPTII,S$GLB,, | Performed by: INTERNAL MEDICINE

## 2024-04-01 PROCEDURE — 1159F MED LIST DOCD IN RCRD: CPT | Mod: CPTII,S$GLB,, | Performed by: INTERNAL MEDICINE

## 2024-04-01 PROCEDURE — 83036 HEMOGLOBIN GLYCOSYLATED A1C: CPT | Performed by: INTERNAL MEDICINE

## 2024-04-01 PROCEDURE — 3074F SYST BP LT 130 MM HG: CPT | Mod: CPTII,S$GLB,, | Performed by: INTERNAL MEDICINE

## 2024-04-01 PROCEDURE — 85025 COMPLETE CBC W/AUTO DIFF WBC: CPT | Performed by: INTERNAL MEDICINE

## 2024-04-01 PROCEDURE — 36415 COLL VENOUS BLD VENIPUNCTURE: CPT | Performed by: INTERNAL MEDICINE

## 2024-04-01 PROCEDURE — 80061 LIPID PANEL: CPT | Performed by: INTERNAL MEDICINE

## 2024-04-01 PROCEDURE — 3288F FALL RISK ASSESSMENT DOCD: CPT | Mod: CPTII,S$GLB,, | Performed by: INTERNAL MEDICINE

## 2024-04-01 RX ORDER — PROMETHAZINE HYDROCHLORIDE AND DEXTROMETHORPHAN HYDROBROMIDE 6.25; 15 MG/5ML; MG/5ML
10 SYRUP ORAL NIGHTLY PRN
Qty: 240 ML | Refills: 0 | Status: SHIPPED | OUTPATIENT
Start: 2024-04-01

## 2024-04-01 RX ORDER — ATORVASTATIN CALCIUM 20 MG/1
20 TABLET, FILM COATED ORAL NIGHTLY
Qty: 90 TABLET | Refills: 3 | Status: SHIPPED | OUTPATIENT
Start: 2024-04-01

## 2024-04-01 RX ORDER — ALBUTEROL SULFATE 90 UG/1
1-2 AEROSOL, METERED RESPIRATORY (INHALATION)
Qty: 18 G | Refills: 0 | Status: SHIPPED | OUTPATIENT
Start: 2024-04-01

## 2024-04-01 NOTE — ASSESSMENT & PLAN NOTE
Reports that he is adherent to his ACE-I.  The current medical regimen is effective;  continue present plan and medications.

## 2024-04-01 NOTE — PROGRESS NOTES
Assessment & Plan  Problem List Items Addressed This Visit          Cardiac/Vascular    Hypertension, well controlled (Chronic)    Overview     [place] [facility] [place]  He would like to stay on his current meds instead of changing to ARB         Current Assessment & Plan     Reports that he is adherent to his ACE-I.  The current medical regimen is effective;  continue present plan and medications.          Hyperlipidemia due to type 2 diabetes mellitus (Chronic)    Overview     10 year CVD risk 23% 09/2018         Current Assessment & Plan     Currently off of statin.  Recheck labs but will need to restart         Atherosclerosis of abdominal aorta (Chronic)    Overview     Seen on x-ray.  Stable, asymptomatic chronic condition.  Will continue to maximize risk factor reduction and adjust medication as needed.          Current Assessment & Plan     Regarding statin            Endocrine    Type 2 diabetes mellitus without complication, without long-term current use of insulin (Chronic)    Current Assessment & Plan     Overdue for labs.  Recheck today.  Reports that he has been adherent to this metformin.           Relevant Orders    CBC Auto Differential    Comprehensive Metabolic Panel    Lipid Panel    Hemoglobin A1C    Microalbumin/Creatinine Ratio, Urine       Other    Tobacco use (Chronic)    Overview     Cigars and chewing tobacco         Current Assessment & Plan     He is cut back on his tobacco use.  I encouraged total cessation          Other Visit Diagnoses       Post-viral cough syndrome    -  Primary  -  I counseled the patient his wife that at this time there is no necessity and swabbing him for acute viral infections as his symptoms been going on for greater than 3-4 weeks and he has no fevers.  His symptoms are consistent with a postviral cough syndrome.  I will provide promethazine DM for use at night.  Discussed with the patient in his wife that I do not recommend adding  codeine.  We will prescribe him albuterol as he does have some faint wheezes in the bases.  I taught the patient how to use an HFA without a spacer.  I have discussed the common side effects of this(these) medication(s) and black box warnings (if applicable) with the patient and answered all of the questions they had at the time of this visit regarding this(these) medication(s).     Relevant Medications    promethazine-dextromethorphan (PROMETHAZINE-DM) 6.25-15 mg/5 mL Syrp    albuterol (PROVENTIL/VENTOLIN HFA) 90 mcg/actuation inhaler              Health Maintenance reviewed, patient deferred due to acute complaints.    Follow-up: Follow up for Routine Physical, plans to est with PCP at [facility].  ______________________________________________________________________    Chief Complaint  Chief Complaint   Patient presents with    Cough     3-weeks, pt been having cough since he had Covid       HPI  Juvencio Viera is a 63 y.o. male with medical diagnoses as listed in the medical history and problem list that presents for cough for 3 weeks.  Pt is known to me with their last appointment 2023.  I last saw him in November of 2020.     He reports he has been having a cough for roughly 3-4 weeks ever since he had COVID.  It is intermittently productive of sputum.  No fevers no chills no night sweats.  He does report some wheezing.  No shortness of breath.  Trying NyQuil and Mucinex without any benefit.  His wife is present reports that she gave him some of her promethazine with codeine cough syrup which she reports helped him sleep.  His wife has questions about whether or not he needs to be swabbed for viral etiologies    He states he has been on his metformin and lisinopril.  He has been off his statin for several years for unclear reasons.    They do state that they need to transition his care to a primary care physician with a low bucket clinic due to proximity to their home      ROS  Review of Systems  "  Constitutional:  Negative for chills, fever and unexpected weight change.   Respiratory:  Positive for cough and wheezing. Negative for chest tightness and shortness of breath.    Cardiovascular:  Negative for chest pain, palpitations and leg swelling.   Gastrointestinal:  Negative for abdominal pain and blood in stool.   Genitourinary:  Negative for decreased urine volume, dysuria and hematuria.   Musculoskeletal:  Negative for arthralgias and myalgias.   Neurological:  Negative for dizziness, weakness, light-headedness and headaches.   Psychiatric/Behavioral:  Negative for decreased concentration, dysphoric mood, sleep disturbance and suicidal ideas.            Physical Exam  Vitals:    04/01/24 0945   BP: 126/74   BP Location: Left arm   Patient Position: Sitting   BP Method: Large (Manual)   Pulse: 66   SpO2: 98%   Weight: 91.3 kg (201 lb 4.5 oz)   Height: 5' 7" (1.702 m)    Body mass index is 31.52 kg/m².  Weight: 91.3 kg (201 lb 4.5 oz)   Height: 5' 7" (170.2 cm)   Physical Exam  Constitutional:       General: He is not in acute distress.     Appearance: He is well-developed.   HENT:      Head: Normocephalic and atraumatic.   Eyes:      General: Lids are normal. No scleral icterus.     Conjunctiva/sclera: Conjunctivae normal.      Pupils: Pupils are equal, round, and reactive to light.   Neck:      Thyroid: No thyromegaly.      Vascular: No carotid bruit or JVD.   Cardiovascular:      Rate and Rhythm: Normal rate and regular rhythm.      Heart sounds: Normal heart sounds.      No friction rub. No S3 or S4 sounds.   Pulmonary:      Effort: Pulmonary effort is normal.      Breath sounds: Examination of the right-lower field reveals wheezing. Examination of the left-lower field reveals wheezing. Wheezing present. No rhonchi or rales.   Abdominal:      General: Bowel sounds are normal. There is no distension.      Palpations: Abdomen is soft.      Tenderness: There is no abdominal tenderness.   Musculoskeletal:  "        General: No tenderness.      Cervical back: Full passive range of motion without pain and neck supple.      Right lower leg: No edema.      Left lower leg: No edema.   Skin:     General: Skin is warm and dry.      Findings: No rash.   Neurological:      Mental Status: He is alert and oriented to person, place, and time.   Psychiatric:         Speech: Speech normal.         Behavior: Behavior normal.         Thought Content: Thought content normal.

## 2024-04-03 DIAGNOSIS — E11.9 TYPE 2 DIABETES MELLITUS WITHOUT COMPLICATION, UNSPECIFIED WHETHER LONG TERM INSULIN USE: ICD-10-CM

## 2024-04-08 ENCOUNTER — PATIENT MESSAGE (OUTPATIENT)
Dept: ADMINISTRATIVE | Facility: HOSPITAL | Age: 64
End: 2024-04-08
Payer: MEDICARE

## 2024-05-09 DIAGNOSIS — I10 HYPERTENSION, WELL CONTROLLED: Chronic | ICD-10-CM

## 2024-05-09 DIAGNOSIS — E11.9 TYPE 2 DIABETES MELLITUS WITHOUT COMPLICATION, WITHOUT LONG-TERM CURRENT USE OF INSULIN: ICD-10-CM

## 2024-05-14 RX ORDER — METFORMIN HYDROCHLORIDE 500 MG/1
1000 TABLET, EXTENDED RELEASE ORAL DAILY
Qty: 180 TABLET | Refills: 2 | OUTPATIENT
Start: 2024-05-14

## 2024-05-14 RX ORDER — LISINOPRIL 20 MG/1
20 TABLET ORAL DAILY
Qty: 90 TABLET | Refills: 2 | OUTPATIENT
Start: 2024-05-14

## 2024-06-10 ENCOUNTER — PATIENT MESSAGE (OUTPATIENT)
Dept: INTERNAL MEDICINE | Facility: CLINIC | Age: 64
End: 2024-06-10
Payer: MEDICARE

## 2024-10-02 DIAGNOSIS — E11.9 TYPE 2 DIABETES MELLITUS WITHOUT COMPLICATION: ICD-10-CM

## 2025-01-30 ENCOUNTER — PATIENT OUTREACH (OUTPATIENT)
Dept: INTERNAL MEDICINE | Facility: CLINIC | Age: 65
End: 2025-01-30
Payer: MEDICARE

## 2025-01-30 DIAGNOSIS — E11.9 DIABETIC EYE EXAM: Primary | ICD-10-CM

## 2025-01-30 DIAGNOSIS — Z12.5 SCREENING FOR PROSTATE CANCER: ICD-10-CM

## 2025-01-30 DIAGNOSIS — Z01.00 DIABETIC EYE EXAM: Primary | ICD-10-CM

## 2025-02-24 DIAGNOSIS — Z00.00 ENCOUNTER FOR MEDICARE ANNUAL WELLNESS EXAM: ICD-10-CM

## 2025-03-31 ENCOUNTER — PATIENT MESSAGE (OUTPATIENT)
Dept: ADMINISTRATIVE | Facility: HOSPITAL | Age: 65
End: 2025-03-31
Payer: MEDICARE

## 2025-04-02 DIAGNOSIS — E11.9 TYPE 2 DIABETES MELLITUS WITHOUT COMPLICATION: ICD-10-CM

## 2025-04-02 DIAGNOSIS — E11.9 TYPE 2 DIABETES MELLITUS WITHOUT COMPLICATION, WITHOUT LONG-TERM CURRENT USE OF INSULIN: ICD-10-CM

## 2025-05-16 ENCOUNTER — TELEPHONE (OUTPATIENT)
Dept: INTERNAL MEDICINE | Facility: CLINIC | Age: 65
End: 2025-05-16
Payer: MEDICARE

## 2025-05-16 NOTE — TELEPHONE ENCOUNTER
----- Message from Med Assistant Saldana sent at 5/16/2025  3:58 PM CDT -----  Contact: [phone]    ----- Message -----  From: Monalisa Gallego  Sent: 5/16/2025   3:18 PM CDT  To: [facility]    .[email or web address] Patient is calling for Medical Advice regarding:pt is trying to schedule enchanced wellness visit Patient wants a call back or thru [facility]:Comments:Please advise patient replies from provider may take up to 48 hours.

## 2025-06-27 ENCOUNTER — TELEPHONE (OUTPATIENT)
Dept: INTERNAL MEDICINE | Facility: CLINIC | Age: 65
End: 2025-06-27
Payer: MEDICARE

## 2025-06-27 NOTE — TELEPHONE ENCOUNTER
Called pt to remind them about their upcoming appointment. Pt's wife confirmed they will be coming in.

## 2025-06-30 ENCOUNTER — PATIENT OUTREACH (OUTPATIENT)
Dept: ADMINISTRATIVE | Facility: HOSPITAL | Age: 65
End: 2025-06-30
Payer: MEDICARE

## 2025-06-30 ENCOUNTER — RESULTS FOLLOW-UP (OUTPATIENT)
Dept: INTERNAL MEDICINE | Facility: CLINIC | Age: 65
End: 2025-06-30

## 2025-06-30 ENCOUNTER — OFFICE VISIT (OUTPATIENT)
Dept: INTERNAL MEDICINE | Facility: CLINIC | Age: 65
End: 2025-06-30
Payer: MEDICARE

## 2025-06-30 ENCOUNTER — LAB VISIT (OUTPATIENT)
Dept: LAB | Facility: HOSPITAL | Age: 65
End: 2025-06-30
Attending: INTERNAL MEDICINE
Payer: MEDICARE

## 2025-06-30 VITALS
HEIGHT: 67 IN | OXYGEN SATURATION: 98 % | WEIGHT: 194.88 LBS | HEART RATE: 54 BPM | DIASTOLIC BLOOD PRESSURE: 70 MMHG | BODY MASS INDEX: 30.59 KG/M2 | SYSTOLIC BLOOD PRESSURE: 126 MMHG

## 2025-06-30 DIAGNOSIS — I10 HYPERTENSION, WELL CONTROLLED: ICD-10-CM

## 2025-06-30 DIAGNOSIS — Z13.6 SCREENING FOR AAA (ABDOMINAL AORTIC ANEURYSM): ICD-10-CM

## 2025-06-30 DIAGNOSIS — Z00.00 ROUTINE MEDICAL EXAM: ICD-10-CM

## 2025-06-30 DIAGNOSIS — Z23 ENCOUNTER FOR PREVNAR PNEUMOCOCCAL VACCINATION: ICD-10-CM

## 2025-06-30 DIAGNOSIS — E11.9 TYPE 2 DIABETES MELLITUS WITHOUT COMPLICATION, WITHOUT LONG-TERM CURRENT USE OF INSULIN: ICD-10-CM

## 2025-06-30 DIAGNOSIS — Z72.0 TOBACCO USE: Chronic | ICD-10-CM

## 2025-06-30 DIAGNOSIS — E11.9 TYPE 2 DIABETES MELLITUS WITHOUT COMPLICATION, WITHOUT LONG-TERM CURRENT USE OF INSULIN: Chronic | ICD-10-CM

## 2025-06-30 DIAGNOSIS — Z12.5 SCREENING PSA (PROSTATE SPECIFIC ANTIGEN): ICD-10-CM

## 2025-06-30 DIAGNOSIS — I10 HYPERTENSION, WELL CONTROLLED: Chronic | ICD-10-CM

## 2025-06-30 DIAGNOSIS — E66.811 OBESITY, CLASS I, BMI 30-34.9: Chronic | ICD-10-CM

## 2025-06-30 DIAGNOSIS — Z00.00 ROUTINE MEDICAL EXAM: Primary | ICD-10-CM

## 2025-06-30 DIAGNOSIS — E78.5 HYPERLIPIDEMIA DUE TO TYPE 2 DIABETES MELLITUS: Chronic | ICD-10-CM

## 2025-06-30 DIAGNOSIS — E11.69 HYPERLIPIDEMIA DUE TO TYPE 2 DIABETES MELLITUS: Chronic | ICD-10-CM

## 2025-06-30 DIAGNOSIS — I70.0 ATHEROSCLEROSIS OF ABDOMINAL AORTA: Chronic | ICD-10-CM

## 2025-06-30 LAB
ALBUMIN SERPL BCP-MCNC: 4.1 G/DL (ref 3.5–5.2)
ALBUMIN/CREAT UR: 5 UG/MG
ALP SERPL-CCNC: 66 UNIT/L (ref 40–150)
ALT SERPL W/O P-5'-P-CCNC: 20 UNIT/L (ref 10–44)
ANION GAP (OHS): 8 MMOL/L (ref 8–16)
AST SERPL-CCNC: 15 UNIT/L (ref 11–45)
BILIRUB SERPL-MCNC: 0.8 MG/DL (ref 0.1–1)
BUN SERPL-MCNC: 18 MG/DL (ref 8–23)
CALCIUM SERPL-MCNC: 9.4 MG/DL (ref 8.7–10.5)
CHLORIDE SERPL-SCNC: 101 MMOL/L (ref 95–110)
CHOLEST SERPL-MCNC: 161 MG/DL (ref 120–199)
CHOLEST/HDLC SERPL: 4.5 {RATIO} (ref 2–5)
CO2 SERPL-SCNC: 28 MMOL/L (ref 23–29)
CREAT SERPL-MCNC: 1 MG/DL (ref 0.5–1.4)
CREAT UR-MCNC: 262 MG/DL (ref 23–375)
EAG (OHS): 148 MG/DL (ref 68–131)
ERYTHROCYTE [DISTWIDTH] IN BLOOD BY AUTOMATED COUNT: 13.8 % (ref 11.5–14.5)
GFR SERPLBLD CREATININE-BSD FMLA CKD-EPI: >60 ML/MIN/1.73/M2
GLUCOSE SERPL-MCNC: 125 MG/DL (ref 70–110)
HBA1C MFR BLD: 6.8 % (ref 4–5.6)
HCT VFR BLD AUTO: 42.9 % (ref 40–54)
HDLC SERPL-MCNC: 36 MG/DL (ref 40–75)
HDLC SERPL: 22.4 % (ref 20–50)
HGB BLD-MCNC: 13.7 GM/DL (ref 14–18)
LDLC SERPL CALC-MCNC: 109.2 MG/DL (ref 63–159)
MCH RBC QN AUTO: 28.8 PG (ref 27–31)
MCHC RBC AUTO-ENTMCNC: 31.9 G/DL (ref 32–36)
MCV RBC AUTO: 90 FL (ref 82–98)
MICROALBUMIN UR-MCNC: 13 UG/ML (ref ?–5000)
NONHDLC SERPL-MCNC: 125 MG/DL
PLATELET # BLD AUTO: 289 K/UL (ref 150–450)
PMV BLD AUTO: 11.3 FL (ref 9.2–12.9)
POTASSIUM SERPL-SCNC: 4.7 MMOL/L (ref 3.5–5.1)
PROT SERPL-MCNC: 7.9 GM/DL (ref 6–8.4)
PSA SERPL-MCNC: 0.57 NG/ML
RBC # BLD AUTO: 4.76 M/UL (ref 4.6–6.2)
SODIUM SERPL-SCNC: 137 MMOL/L (ref 136–145)
TRIGL SERPL-MCNC: 79 MG/DL (ref 30–150)
WBC # BLD AUTO: 5.26 K/UL (ref 3.9–12.7)

## 2025-06-30 PROCEDURE — 85027 COMPLETE CBC AUTOMATED: CPT | Mod: HCNC

## 2025-06-30 PROCEDURE — 99999 PR PBB SHADOW E&M-EST. PATIENT-LVL IV: CPT | Mod: PBBFAC,HCNC,, | Performed by: INTERNAL MEDICINE

## 2025-06-30 PROCEDURE — 82043 UR ALBUMIN QUANTITATIVE: CPT | Mod: HCNC | Performed by: INTERNAL MEDICINE

## 2025-06-30 PROCEDURE — 3074F SYST BP LT 130 MM HG: CPT | Mod: CPTII,HCNC,S$GLB, | Performed by: INTERNAL MEDICINE

## 2025-06-30 PROCEDURE — 84153 ASSAY OF PSA TOTAL: CPT | Mod: HCNC

## 2025-06-30 PROCEDURE — 84450 TRANSFERASE (AST) (SGOT): CPT | Mod: HCNC

## 2025-06-30 PROCEDURE — 1159F MED LIST DOCD IN RCRD: CPT | Mod: CPTII,HCNC,S$GLB, | Performed by: INTERNAL MEDICINE

## 2025-06-30 PROCEDURE — 3078F DIAST BP <80 MM HG: CPT | Mod: CPTII,HCNC,S$GLB, | Performed by: INTERNAL MEDICINE

## 2025-06-30 PROCEDURE — 90677 PCV20 VACCINE IM: CPT | Mod: HCNC,S$GLB,, | Performed by: INTERNAL MEDICINE

## 2025-06-30 PROCEDURE — 83718 ASSAY OF LIPOPROTEIN: CPT | Mod: HCNC

## 2025-06-30 PROCEDURE — 1126F AMNT PAIN NOTED NONE PRSNT: CPT | Mod: CPTII,HCNC,S$GLB, | Performed by: INTERNAL MEDICINE

## 2025-06-30 PROCEDURE — 84460 ALANINE AMINO (ALT) (SGPT): CPT | Mod: HCNC

## 2025-06-30 PROCEDURE — 1101F PT FALLS ASSESS-DOCD LE1/YR: CPT | Mod: CPTII,HCNC,S$GLB, | Performed by: INTERNAL MEDICINE

## 2025-06-30 PROCEDURE — 3008F BODY MASS INDEX DOCD: CPT | Mod: CPTII,HCNC,S$GLB, | Performed by: INTERNAL MEDICINE

## 2025-06-30 PROCEDURE — G0009 ADMIN PNEUMOCOCCAL VACCINE: HCPCS | Mod: HCNC,S$GLB,, | Performed by: INTERNAL MEDICINE

## 2025-06-30 PROCEDURE — 1160F RVW MEDS BY RX/DR IN RCRD: CPT | Mod: CPTII,HCNC,S$GLB, | Performed by: INTERNAL MEDICINE

## 2025-06-30 PROCEDURE — 99397 PER PM REEVAL EST PAT 65+ YR: CPT | Mod: HCNC,S$GLB,, | Performed by: INTERNAL MEDICINE

## 2025-06-30 PROCEDURE — 36415 COLL VENOUS BLD VENIPUNCTURE: CPT | Mod: HCNC

## 2025-06-30 PROCEDURE — 83036 HEMOGLOBIN GLYCOSYLATED A1C: CPT | Mod: HCNC

## 2025-06-30 PROCEDURE — 3288F FALL RISK ASSESSMENT DOCD: CPT | Mod: CPTII,HCNC,S$GLB, | Performed by: INTERNAL MEDICINE

## 2025-06-30 NOTE — LETTER
AUTHORIZATION FOR RELEASE OF   CONFIDENTIAL INFORMATION    Dear [facility],    We are seeing Juvencio Viera, date of birth 1960, in the clinic at [facility]. Brice Manuel MD is the patient's PCP. Juvencio Viera has an outstanding lab/procedure at the time we reviewed his chart. In order to help keep his health information updated, he has authorized us to request the following medical record(s):        (  )  MAMMOGRAM                                      (  )  COLONOSCOPY      (  )  PAP SMEAR                                          (  )  OUTSIDE LAB RESULTS     (  )  DEXA SCAN                                          (  x)  EYE EXAM            (  )  FOOT EXAM                                          (  )  ENTIRE RECORD     (  )  OUTSIDE IMMUNIZATIONS                 (  )  _______________         Please fax records to [facility], Mollere, Oliver B, MD,  at [phone] or email to [email or web address].       If you have any questions, please contact DEEPIKA Patel [facility] at [phone].           Patient Name: Juvencio Viera  : 1960  Patient Phone #: [phone]

## 2025-06-30 NOTE — PROGRESS NOTES
Assessment & Plan  Assessment & Plan    IMPRESSION:  - Assessed cardiovascular risk factors, including smoking history, HTN, and HLD.  - Evaluated diabetes management based on reported glucose readings.  - Considered adjusting statin therapy due to lapse in medication adherence.  - Reviewed preventive care needs, including vaccinations and cancer screenings.    PLAN SUMMARY:  - Comprehensive labs ordered  - Administered pneumococcal vaccine  - Continue Metformin 2 tablets daily  - Continue Lisinopril as prescribed  - Abdominal aortic ultrasound ordered for screening  - Recommend complete smoking and chewing tobacco cessation  - Contact office when lab results are available to discuss potential medication adjustments  - Schedule Annual Wellness Visit with SOURAV Marinelli  - Follow up in 6 months for diabetes check, foot exam, and to discuss lab results    TYPE 2 DIABETES MELLITUS:  - Mr. Viera has good glycemic control with morning blood sugar readings of 105-120.  - Continue Metformin 2 tablets daily.  - Mr. Viera should continue home glucose monitoring.  - Comprehensive labs ordered today.  - Follow up in 6 months for foot exam and to discuss lab results.  - Contact office when results are available to discuss potential medication adjustments.    ESSENTIAL HYPERTENSION:  - Blood pressure is well-controlled on current Lisinopril regimen.  - Continue medication as prescribed.  - Explained importance of statin therapy for cholesterol management.  - Ordered comprehensive labs and abdominal aortic ultrasound for screening.  - Contact office when lab results are available to discuss potential medication adjustments.    NICOTINE DEPENDENCE (CIGARETTES):  - Mr. Viera has reduced smoking significantly but continues to use cigarettes.  - Evaluated cardiovascular and malignancy risks associated with smoking.  - Educated on health risks including increased risk of myocardial infarction, cerebrovascular accident, and  malignancies.  - Recommend complete smoking cessation.    NICOTINE DEPENDENCE (CHEWING TOBACCO):  - Mr. Viera uses chewing tobacco during yard work.  - Evaluated and discussed risks of oral/gastric malignancies and cardiovascular events associated with use.  - Recommend complete cessation of chewing tobacco.    PREVENTIVE CARE:  - Administered pneumococcal vaccine during today's visit.  - Provided information on shingles vaccine, including potential side effects and efficacy, and recommended getting it in the future.  - Explained RSV (RSV) and purpose of vaccination.    FOLLOW-UP:  - Schedule Annual Wellness Visit with SOURAV Marinelli.  - Return in 6 months for diabetes follow-up including foot exam and to discuss lab results.         1. Routine medical exam  -     CBC Without Differential; Future; Expected date: 06/30/2025  -     Comprehensive Metabolic Panel; Future; Expected date: 06/30/2025  -     Lipid Panel; Future; Expected date: 06/30/2025  -     Hemoglobin A1C; Future; Expected date: 06/30/2025  -     Cancel: Microalbumin/Creatinine Ratio, Urine; Future; Expected date: 06/30/2025  -     PSA, Screening; Future; Expected date: 06/30/2025    2. Type 2 diabetes mellitus without complication, without long-term current use of insulin  Assessment & Plan:  On metformin. The current medical regimen is effective;  continue present plan and medications. Will get eye records.     Sign up for digital    Orders:  -     Hemoglobin A1C; Future; Expected date: 06/30/2025  -     Cancel: Microalbumin/Creatinine Ratio, Urine; Future; Expected date: 06/30/2025  -     Microalbumin/Creatinine Ratio, Urine  -     Diabetes Digital Medicine (DDMP) Enrollment Order    3. Hyperlipidemia due to type 2 diabetes mellitus  Overview:  10 year CVD risk 23% 09/2018    Assessment & Plan:  Lost to follow up after last year.  Plan to restart statin.  Check labs 1st      4. Atherosclerosis of abdominal aorta  Overview:  Seen on x-ray.  Stable,  asymptomatic chronic condition.  Will continue to maximize risk factor reduction and adjust medication as needed.     Assessment & Plan:  Plan to increase statin to high dose.  Check labs first to determine dose      5. Hypertension, well controlled  Overview:  Counseled on data in the [facility] [place] regarding ACE-I.  He would like to stay on his current meds instead of changing to ARB    Assessment & Plan:  On linisopril. The current medical regimen is effective;  continue present plan and medications.     Sign up for digital    Orders:  -     CBC Without Differential; Future; Expected date: 06/30/2025  -     Comprehensive Metabolic Panel; Future; Expected date: 06/30/2025  -     Lipid Panel; Future; Expected date: 06/30/2025  -     Hypertension Digital Medicine (HDMP) Enrollment Order    6. Obesity, Class I, BMI 30-34.9  Assessment & Plan:  The patient's BMI has been recorded in the chart. The patient has been provided educational materials regarding the benefits of attaining and maintaining a normal weight. We will continue to address and follow this issue during follow up visits.      7. Tobacco use  Overview:  Cigars and chewing tobacco    Assessment & Plan:  He is cut back on his tobacco use.  I encouraged total cessation.      8. Screening PSA (prostate specific antigen)  -     PSA, Screening; Future; Expected date: 06/30/2025    9. Encounter for Prevnar pneumococcal vaccination  -     pneumoc 20-hola conj-dip cr(PF) (PREVNAR-20 (PF)) injection Syrg 0.5 mL    10. Screening for AAA (abdominal aortic aneurysm)  -     US Abdominal Aorta; Future; Expected date: 06/30/2025          Follow-up: Follow up pending labs.  6 months with [facility] at the longest.    This note was generated with the assistance of ambient listening technology. Verbal consent was obtained by the patient and accompanying visitor(s) for the recording of patient appointment to facilitate this note. I attest to having reviewed and edited the generated note  "for accuracy, though some syntax or spelling errors may persist. Please contact the author of this note for any clarification.      ______________________________________________________________________    Chief Complaint  Chief Complaint   Patient presents with    Annual Exam       History of Present Illness    CHIEF COMPLAINT:  Mr. Viera presents today for routine physical exam.    MEDICATIONS AND DIABETES MANAGEMENT:  He takes Lisinopril for blood pressure and Metformin twice daily for diabetes management. He discontinued Atorvastatin 20mg. His morning glucose readings range from 105-120 mg/dL, which he monitors using a digital system allowing remote healthcare provider tracking.    TOBACCO USE:  He currently smokes cigarettes approximately twice weekly, reduced from previous heavy smoking patterns. He uses chewing tobacco intermittently during outdoor work activities.    REVIEW OF SYSTEMS:  He denies fevers, chills, chest pain, palpitations, abdominal pain, GI issues, blood in stool, urinary symptoms, chronic joint pain, dizziness, and lightheadedness. He reports fair sleep quality.    PREVENTIVE CARE:  He has a colonoscopy scheduled for the 8th and completed an eye exam at [facility] in [place] one month ago.            The 10-year ASCVD risk score (Opal DE LA CRUZ, et al., 2019) is: 32.2%    Values used to calculate the score:      Age: 65 years      Sex: Male      Is Non- : Yes      Diabetic: Yes      Tobacco smoker: Yes      Systolic Blood Pressure: 126 mmHg      Is BP treated: No      HDL Cholesterol: 32 mg/dL      Total Cholesterol: 162 mg/dL     ROS  Review of Systems        Physical Exam  Vitals:    06/30/25 0805   BP: 126/70   BP Location: Left arm   Patient Position: Sitting   Pulse: (!) 54   SpO2: 98%   Weight: 88.4 kg (194 lb 14.2 oz)   Height: 5' 7" (1.702 m)    Body mass index is 30.52 kg/m².  Weight: 88.4 kg (194 lb 14.2 oz)   Height: 5' 7" (170.2 cm)   Physical " Exam  Constitutional:       General: He is not in acute distress.     Appearance: He is well-developed.   HENT:      Head: Normocephalic and atraumatic.      Right Ear: There is impacted cerumen (partial, soft).      Left Ear: There is impacted cerumen (partial, soft).   Eyes:      General: Lids are normal. No scleral icterus.     Conjunctiva/sclera: Conjunctivae normal.      Pupils: Pupils are equal, round, and reactive to light.   Neck:      Thyroid: No thyromegaly.      Vascular: No carotid bruit or JVD.   Cardiovascular:      Rate and Rhythm: Normal rate and regular rhythm.      Heart sounds: Normal heart sounds. No murmur heard.     No friction rub. No S3 or S4 sounds.   Pulmonary:      Effort: Pulmonary effort is normal.      Breath sounds: Normal breath sounds. No wheezing, rhonchi or rales.   Abdominal:      General: Bowel sounds are normal. There is no distension.      Palpations: Abdomen is soft.      Tenderness: There is no guarding.   Musculoskeletal:      Cervical back: Full passive range of motion without pain and neck supple.      Right lower leg: No edema.      Left lower leg: No edema.   Skin:     General: Skin is warm and dry.      Findings: No rash.   Neurological:      Mental Status: He is alert and oriented to person, place, and time.   Psychiatric:         Speech: Speech normal.         Behavior: Behavior normal.         Thought Content: Thought content normal.

## 2025-06-30 NOTE — Clinical Note
Can we please set Mr. Viera up to see [facility] for his AWV?  Something in the next few weeks or month is fine.   Thank you, Timmy

## 2025-06-30 NOTE — ASSESSMENT & PLAN NOTE
On linisopril. The current medical regimen is effective;  continue present plan and medications.     Sign up for digital

## 2025-06-30 NOTE — ASSESSMENT & PLAN NOTE
On metformin. The current medical regimen is effective;  continue present plan and medications. Will get eye records.     Sign up for digital

## 2025-06-30 NOTE — PROGRESS NOTES
Chart reviewed and updated. Reconciled immunizations.requested eye exam record from [facility]  Amanda Chacko LPN   Clinical Care Coordinator  Primary Care and Wellness

## 2025-07-11 DIAGNOSIS — E11.69 HYPERLIPIDEMIA DUE TO TYPE 2 DIABETES MELLITUS: ICD-10-CM

## 2025-07-11 DIAGNOSIS — E11.9 TYPE 2 DIABETES MELLITUS WITHOUT COMPLICATION, WITHOUT LONG-TERM CURRENT USE OF INSULIN: ICD-10-CM

## 2025-07-11 DIAGNOSIS — E78.5 HYPERLIPIDEMIA DUE TO TYPE 2 DIABETES MELLITUS: ICD-10-CM

## 2025-07-11 DIAGNOSIS — I10 HYPERTENSION, WELL CONTROLLED: Chronic | ICD-10-CM

## 2025-07-11 NOTE — TELEPHONE ENCOUNTER
No care due was identified.  [facility] Embedded Care Due Messages. Reference number: [id number].   7/11/2025 5:38:19 PM CDT

## 2025-07-14 ENCOUNTER — TELEPHONE (OUTPATIENT)
Dept: INTERNAL MEDICINE | Facility: CLINIC | Age: 65
End: 2025-07-14
Payer: MEDICARE

## 2025-07-14 RX ORDER — METFORMIN HYDROCHLORIDE 500 MG/1
1000 TABLET, EXTENDED RELEASE ORAL DAILY
Qty: 180 TABLET | Refills: 1 | Status: SHIPPED | OUTPATIENT
Start: 2025-07-14

## 2025-07-14 RX ORDER — LISINOPRIL 20 MG/1
20 TABLET ORAL DAILY
Qty: 90 TABLET | Refills: 3 | Status: SHIPPED | OUTPATIENT
Start: 2025-07-14

## 2025-07-14 RX ORDER — ATORVASTATIN CALCIUM 40 MG/1
40 TABLET, FILM COATED ORAL NIGHTLY
Qty: 90 TABLET | Refills: 3 | Status: SHIPPED | OUTPATIENT
Start: 2025-07-14

## 2025-07-14 NOTE — TELEPHONE ENCOUNTER
Called to speak to patient. Wife answered the phone. Informed her that patients meds are at [facility] and waiting for him to pick them up. Patient stated he is not doing the US because his copay is $100 dollars.